# Patient Record
Sex: MALE | Race: BLACK OR AFRICAN AMERICAN | NOT HISPANIC OR LATINO | Employment: OTHER | RURAL
[De-identification: names, ages, dates, MRNs, and addresses within clinical notes are randomized per-mention and may not be internally consistent; named-entity substitution may affect disease eponyms.]

---

## 2017-04-19 ENCOUNTER — HISTORICAL (OUTPATIENT)
Dept: ADMINISTRATIVE | Facility: HOSPITAL | Age: 57
End: 2017-04-19

## 2017-04-20 LAB
LAB AP CLINICAL INFORMATION: NORMAL
LAB AP DIAGNOSIS - HISTORICAL: NORMAL
LAB AP GROSS PATHOLOGY - HISTORICAL: NORMAL
LAB AP SPECIMEN SUBMITTED - HISTORICAL: NORMAL

## 2020-05-12 ENCOUNTER — HISTORICAL (OUTPATIENT)
Dept: ADMINISTRATIVE | Facility: HOSPITAL | Age: 60
End: 2020-05-12

## 2020-05-14 LAB
LAB AP CLINICAL INFORMATION: NORMAL
LAB AP COMMENTS: NORMAL
LAB AP DIAGNOSIS - HISTORICAL: NORMAL
LAB AP GROSS PATHOLOGY - HISTORICAL: NORMAL
LAB AP SPECIMEN SUBMITTED - HISTORICAL: NORMAL

## 2021-05-10 ENCOUNTER — OFFICE VISIT (OUTPATIENT)
Dept: UROLOGY | Facility: CLINIC | Age: 61
End: 2021-05-10

## 2021-05-10 VITALS
BODY MASS INDEX: 35.36 KG/M2 | HEIGHT: 66 IN | WEIGHT: 220 LBS | SYSTOLIC BLOOD PRESSURE: 128 MMHG | TEMPERATURE: 98 F | OXYGEN SATURATION: 98 % | DIASTOLIC BLOOD PRESSURE: 80 MMHG | HEART RATE: 81 BPM

## 2021-05-10 DIAGNOSIS — C61 PROSTATE CANCER: Primary | ICD-10-CM

## 2021-05-10 DIAGNOSIS — R31.9 HEMATURIA, UNSPECIFIED TYPE: ICD-10-CM

## 2021-05-10 DIAGNOSIS — R97.20 PSA ELEVATION: ICD-10-CM

## 2021-05-10 LAB
BILIRUB UR QL STRIP: NEGATIVE
CLARITY UR: ABNORMAL
COLOR UR: YELLOW
GLUCOSE UR STRIP-MCNC: NEGATIVE MG/DL
KETONES UR STRIP-SCNC: NEGATIVE MG/DL
LEUKOCYTE ESTERASE UR QL STRIP: NEGATIVE
NITRITE UR QL STRIP: NEGATIVE
PH UR STRIP: 5 PH UNITS
PROT UR QL STRIP: NEGATIVE
RBC # UR STRIP: ABNORMAL /UL
SP GR UR STRIP: 1.02
UROBILINOGEN UR STRIP-ACNC: 0.2 MG/DL

## 2021-05-10 PROCEDURE — 81003 URINALYSIS, REFLEX TO URINE CULTURE: ICD-10-PCS | Mod: QW,,, | Performed by: CLINICAL MEDICAL LABORATORY

## 2021-05-10 PROCEDURE — 99213 PR OFFICE/OUTPT VISIT, EST, LEVL III, 20-29 MIN: ICD-10-PCS | Mod: S$PBB,,, | Performed by: UROLOGY

## 2021-05-10 PROCEDURE — 81003 URINALYSIS AUTO W/O SCOPE: CPT | Mod: QW,,, | Performed by: CLINICAL MEDICAL LABORATORY

## 2021-05-10 PROCEDURE — 99213 OFFICE O/P EST LOW 20 MIN: CPT | Mod: S$PBB,,, | Performed by: UROLOGY

## 2021-05-10 PROCEDURE — 99204 OFFICE O/P NEW MOD 45 MIN: CPT | Mod: PBBFAC | Performed by: UROLOGY

## 2021-05-10 RX ORDER — HYDROCHLOROTHIAZIDE 12.5 MG/1
12.5 CAPSULE ORAL DAILY
COMMUNITY
End: 2022-01-25 | Stop reason: SDUPTHER

## 2021-05-10 RX ORDER — MELOXICAM 7.5 MG/1
7.5 TABLET ORAL DAILY PRN
COMMUNITY
End: 2023-03-03 | Stop reason: SDUPTHER

## 2021-05-10 RX ORDER — TAMSULOSIN HYDROCHLORIDE 0.4 MG/1
CAPSULE ORAL DAILY
COMMUNITY
End: 2022-08-31 | Stop reason: SDUPTHER

## 2021-05-10 RX ORDER — LISINOPRIL 40 MG/1
40 TABLET ORAL DAILY
COMMUNITY
End: 2021-10-22

## 2021-09-06 ENCOUNTER — OFFICE VISIT (OUTPATIENT)
Dept: FAMILY MEDICINE | Facility: CLINIC | Age: 61
End: 2021-09-06
Payer: COMMERCIAL

## 2021-09-06 VITALS
WEIGHT: 214.38 LBS | BODY MASS INDEX: 34.45 KG/M2 | SYSTOLIC BLOOD PRESSURE: 133 MMHG | HEIGHT: 66 IN | RESPIRATION RATE: 17 BRPM | OXYGEN SATURATION: 99 % | TEMPERATURE: 99 F | DIASTOLIC BLOOD PRESSURE: 94 MMHG | HEART RATE: 62 BPM

## 2021-09-06 DIAGNOSIS — R31.0 GROSS HEMATURIA: ICD-10-CM

## 2021-09-06 DIAGNOSIS — N48.1 BALANITIS: ICD-10-CM

## 2021-09-06 DIAGNOSIS — R31.9 HEMATURIA: Primary | ICD-10-CM

## 2021-09-06 LAB
BILIRUB SERPL-MCNC: NEGATIVE MG/DL
BLOOD URINE, POC: ABNORMAL
COLOR, POC UA: ABNORMAL
GLUCOSE UR QL STRIP: NEGATIVE
KETONES UR QL STRIP: NEGATIVE
LEUKOCYTE ESTERASE URINE, POC: NEGATIVE
NITRITE, POC UA: NEGATIVE
PH, POC UA: 5.5
PROTEIN, POC: ABNORMAL
SPECIFIC GRAVITY, POC UA: 1.02
UROBILINOGEN, POC UA: 0.2

## 2021-09-06 PROCEDURE — 99214 OFFICE O/P EST MOD 30 MIN: CPT | Mod: ,,, | Performed by: FAMILY MEDICINE

## 2021-09-06 PROCEDURE — 99214 PR OFFICE/OUTPT VISIT, EST, LEVL IV, 30-39 MIN: ICD-10-PCS | Mod: ,,, | Performed by: FAMILY MEDICINE

## 2021-09-06 PROCEDURE — 81003 URINALYSIS AUTO W/O SCOPE: CPT | Mod: QW,,, | Performed by: FAMILY MEDICINE

## 2021-09-06 PROCEDURE — 81003 POCT URINALYSIS W/O SCOPE: ICD-10-PCS | Mod: QW,,, | Performed by: FAMILY MEDICINE

## 2021-09-06 RX ORDER — AMOXICILLIN AND CLAVULANATE POTASSIUM 875; 125 MG/1; MG/1
1 TABLET, FILM COATED ORAL EVERY 12 HOURS
Qty: 20 TABLET | Refills: 0 | Status: SHIPPED | OUTPATIENT
Start: 2021-09-06 | End: 2021-09-06

## 2021-09-06 RX ORDER — FLUCONAZOLE 200 MG/1
TABLET ORAL
Qty: 3 TABLET | Refills: 0 | Status: SHIPPED | OUTPATIENT
Start: 2021-09-06 | End: 2021-09-06

## 2021-09-28 ENCOUNTER — OFFICE VISIT (OUTPATIENT)
Dept: UROLOGY | Facility: CLINIC | Age: 61
End: 2021-09-28
Payer: COMMERCIAL

## 2021-09-28 VITALS
WEIGHT: 215 LBS | HEIGHT: 67 IN | BODY MASS INDEX: 33.74 KG/M2 | SYSTOLIC BLOOD PRESSURE: 147 MMHG | DIASTOLIC BLOOD PRESSURE: 100 MMHG | HEART RATE: 95 BPM

## 2021-09-28 DIAGNOSIS — M54.50 ACUTE LEFT-SIDED LOW BACK PAIN WITHOUT SCIATICA: ICD-10-CM

## 2021-09-28 DIAGNOSIS — N20.0 NEPHROLITHIASIS: ICD-10-CM

## 2021-09-28 DIAGNOSIS — R31.9 HEMATURIA, UNSPECIFIED TYPE: ICD-10-CM

## 2021-09-28 DIAGNOSIS — R31.9 HEMATURIA OF UNKNOWN CAUSE: ICD-10-CM

## 2021-09-28 DIAGNOSIS — C61 PROSTATE CANCER: Primary | ICD-10-CM

## 2021-09-28 DIAGNOSIS — N32.0 BLADDER OUTLET OBSTRUCTION: ICD-10-CM

## 2021-09-28 PROCEDURE — 99214 OFFICE O/P EST MOD 30 MIN: CPT | Mod: S$PBB,,, | Performed by: UROLOGY

## 2021-09-28 PROCEDURE — 99214 PR OFFICE/OUTPT VISIT, EST, LEVL IV, 30-39 MIN: ICD-10-PCS | Mod: S$PBB,,, | Performed by: UROLOGY

## 2021-09-28 PROCEDURE — 99214 OFFICE O/P EST MOD 30 MIN: CPT | Mod: PBBFAC | Performed by: UROLOGY

## 2021-10-12 ENCOUNTER — PROCEDURE VISIT (OUTPATIENT)
Dept: UROLOGY | Facility: CLINIC | Age: 61
End: 2021-10-12
Payer: COMMERCIAL

## 2021-10-12 VITALS
HEIGHT: 67 IN | HEART RATE: 91 BPM | WEIGHT: 218 LBS | SYSTOLIC BLOOD PRESSURE: 124 MMHG | BODY MASS INDEX: 34.21 KG/M2 | DIASTOLIC BLOOD PRESSURE: 86 MMHG

## 2021-10-12 DIAGNOSIS — Z01.812 ENCOUNTER FOR PREOPERATIVE SCREENING LABORATORY TESTING FOR COVID-19 VIRUS: ICD-10-CM

## 2021-10-12 DIAGNOSIS — Z01.810 PRE-PROCEDURAL CARDIOVASCULAR EXAMINATION: ICD-10-CM

## 2021-10-12 DIAGNOSIS — N13.8 BENIGN PROSTATIC HYPERPLASIA WITH URINARY OBSTRUCTION: Primary | ICD-10-CM

## 2021-10-12 DIAGNOSIS — N20.0 KIDNEY STONES: ICD-10-CM

## 2021-10-12 DIAGNOSIS — C61 CARCINOMA OF PROSTATE: ICD-10-CM

## 2021-10-12 DIAGNOSIS — R31.9 URINARY TRACT INFECTION WITH HEMATURIA, SITE UNSPECIFIED: ICD-10-CM

## 2021-10-12 DIAGNOSIS — N21.0 BLADDER STONES: ICD-10-CM

## 2021-10-12 DIAGNOSIS — Z11.52 ENCOUNTER FOR PREOPERATIVE SCREENING LABORATORY TESTING FOR COVID-19 VIRUS: ICD-10-CM

## 2021-10-12 DIAGNOSIS — N39.0 URINARY TRACT INFECTION WITH HEMATURIA, SITE UNSPECIFIED: ICD-10-CM

## 2021-10-12 DIAGNOSIS — Z01.812 PRE-PROCEDURAL LABORATORY EXAMINATION: ICD-10-CM

## 2021-10-12 DIAGNOSIS — N40.1 BENIGN PROSTATIC HYPERPLASIA WITH URINARY OBSTRUCTION: Primary | ICD-10-CM

## 2021-10-12 DIAGNOSIS — R31.9 HEMATURIA, UNSPECIFIED TYPE: ICD-10-CM

## 2021-10-12 PROCEDURE — 52000 PR CYSTOURETHROSCOPY: ICD-10-PCS | Mod: S$PBB,,, | Performed by: UROLOGY

## 2021-10-12 PROCEDURE — 87086 URINE CULTURE/COLONY COUNT: CPT | Mod: ,,, | Performed by: CLINICAL MEDICAL LABORATORY

## 2021-10-12 PROCEDURE — 52000 CYSTOURETHROSCOPY: CPT | Mod: PBBFAC | Performed by: UROLOGY

## 2021-10-12 PROCEDURE — 52000 CYSTOURETHROSCOPY: CPT | Mod: S$PBB,,, | Performed by: UROLOGY

## 2021-10-12 PROCEDURE — 87086 CULTURE, URINE: ICD-10-PCS | Mod: ,,, | Performed by: CLINICAL MEDICAL LABORATORY

## 2021-10-14 DIAGNOSIS — N40.1 BENIGN PROSTATIC HYPERPLASIA WITH URINARY OBSTRUCTION: Primary | ICD-10-CM

## 2021-10-14 DIAGNOSIS — N13.8 BENIGN PROSTATIC HYPERPLASIA WITH URINARY OBSTRUCTION: Primary | ICD-10-CM

## 2021-10-14 LAB — UA COMPLETE W REFLEX CULTURE PNL UR: NO GROWTH

## 2021-10-14 RX ORDER — CEFAZOLIN SODIUM 2 G/50ML
2 SOLUTION INTRAVENOUS ONCE
Status: CANCELLED | OUTPATIENT
Start: 2021-11-03

## 2021-10-14 RX ORDER — SODIUM CHLORIDE, SODIUM LACTATE, POTASSIUM CHLORIDE, CALCIUM CHLORIDE 600; 310; 30; 20 MG/100ML; MG/100ML; MG/100ML; MG/100ML
INJECTION, SOLUTION INTRAVENOUS CONTINUOUS
Status: CANCELLED | OUTPATIENT
Start: 2021-11-03

## 2021-11-01 ENCOUNTER — OFFICE VISIT (OUTPATIENT)
Dept: UROLOGY | Facility: CLINIC | Age: 61
End: 2021-11-01
Payer: COMMERCIAL

## 2021-11-01 ENCOUNTER — CLINICAL SUPPORT (OUTPATIENT)
Dept: CARDIOLOGY | Facility: CLINIC | Age: 61
End: 2021-11-01
Payer: COMMERCIAL

## 2021-11-01 ENCOUNTER — HOSPITAL ENCOUNTER (OUTPATIENT)
Dept: RADIOLOGY | Facility: HOSPITAL | Age: 61
Discharge: HOME | End: 2021-11-01
Attending: UROLOGY
Payer: COMMERCIAL

## 2021-11-01 VITALS
HEIGHT: 67 IN | WEIGHT: 215 LBS | SYSTOLIC BLOOD PRESSURE: 120 MMHG | BODY MASS INDEX: 33.74 KG/M2 | HEART RATE: 98 BPM | DIASTOLIC BLOOD PRESSURE: 79 MMHG

## 2021-11-01 DIAGNOSIS — N39.0 URINARY TRACT INFECTION WITH HEMATURIA, SITE UNSPECIFIED: ICD-10-CM

## 2021-11-01 DIAGNOSIS — R31.9 URINARY TRACT INFECTION WITH HEMATURIA, SITE UNSPECIFIED: ICD-10-CM

## 2021-11-01 DIAGNOSIS — R31.0 GROSS HEMATURIA: ICD-10-CM

## 2021-11-01 DIAGNOSIS — N40.1 BENIGN PROSTATIC HYPERPLASIA WITH URINARY OBSTRUCTION: Primary | ICD-10-CM

## 2021-11-01 DIAGNOSIS — N20.0 KIDNEY STONES: ICD-10-CM

## 2021-11-01 DIAGNOSIS — N21.0 BLADDER STONE: ICD-10-CM

## 2021-11-01 DIAGNOSIS — Z01.810 PRE-PROCEDURAL CARDIOVASCULAR EXAMINATION: ICD-10-CM

## 2021-11-01 DIAGNOSIS — N13.8 BENIGN PROSTATIC HYPERPLASIA WITH URINARY OBSTRUCTION: Primary | ICD-10-CM

## 2021-11-01 PROCEDURE — 93005 ELECTROCARDIOGRAM TRACING: CPT | Mod: PBBFAC | Performed by: INTERNAL MEDICINE

## 2021-11-01 PROCEDURE — 74018 RADEX ABDOMEN 1 VIEW: CPT | Mod: TC

## 2021-11-01 PROCEDURE — 99024 PR POST-OP FOLLOW-UP VISIT: ICD-10-PCS | Mod: ,,, | Performed by: UROLOGY

## 2021-11-01 PROCEDURE — 87086 CULTURE, URINE: ICD-10-PCS | Mod: ,,, | Performed by: CLINICAL MEDICAL LABORATORY

## 2021-11-01 PROCEDURE — 87086 URINE CULTURE/COLONY COUNT: CPT | Mod: ,,, | Performed by: CLINICAL MEDICAL LABORATORY

## 2021-11-01 PROCEDURE — 74018 RADEX ABDOMEN 1 VIEW: CPT | Mod: 26,,, | Performed by: RADIOLOGY

## 2021-11-01 PROCEDURE — 99213 OFFICE O/P EST LOW 20 MIN: CPT | Mod: PBBFAC | Performed by: UROLOGY

## 2021-11-01 PROCEDURE — 93010 ELECTROCARDIOGRAM REPORT: CPT | Mod: S$PBB,,, | Performed by: INTERNAL MEDICINE

## 2021-11-01 PROCEDURE — 93010 EKG 12-LEAD: ICD-10-PCS | Mod: S$PBB,,, | Performed by: INTERNAL MEDICINE

## 2021-11-01 PROCEDURE — 99212 OFFICE O/P EST SF 10 MIN: CPT | Mod: PBBFAC

## 2021-11-01 PROCEDURE — 99024 POSTOP FOLLOW-UP VISIT: CPT | Mod: ,,, | Performed by: UROLOGY

## 2021-11-01 PROCEDURE — 74018 XR KUB: ICD-10-PCS | Mod: 26,,, | Performed by: RADIOLOGY

## 2021-11-03 ENCOUNTER — HOSPITAL ENCOUNTER (OUTPATIENT)
Facility: HOSPITAL | Age: 61
Discharge: HOME | End: 2021-11-04
Attending: UROLOGY | Admitting: UROLOGY
Payer: COMMERCIAL

## 2021-11-03 ENCOUNTER — ANESTHESIA EVENT (OUTPATIENT)
Dept: SURGERY | Facility: HOSPITAL | Age: 61
End: 2021-11-03
Payer: COMMERCIAL

## 2021-11-03 ENCOUNTER — ANESTHESIA (OUTPATIENT)
Dept: SURGERY | Facility: HOSPITAL | Age: 61
End: 2021-11-03
Payer: COMMERCIAL

## 2021-11-03 DIAGNOSIS — N40.1 BENIGN PROSTATIC HYPERPLASIA WITH URINARY OBSTRUCTION: ICD-10-CM

## 2021-11-03 DIAGNOSIS — N13.8 BENIGN PROSTATIC HYPERPLASIA WITH URINARY OBSTRUCTION: ICD-10-CM

## 2021-11-03 PROCEDURE — 63600175 PHARM REV CODE 636 W HCPCS: Performed by: UROLOGY

## 2021-11-03 PROCEDURE — 25000003 PHARM REV CODE 250: Performed by: ANESTHESIOLOGY

## 2021-11-03 PROCEDURE — 37000008 HC ANESTHESIA 1ST 15 MINUTES: Performed by: UROLOGY

## 2021-11-03 PROCEDURE — 36000707: Performed by: UROLOGY

## 2021-11-03 PROCEDURE — 63600175 PHARM REV CODE 636 W HCPCS: Performed by: ANESTHESIOLOGY

## 2021-11-03 PROCEDURE — 52601 PR TRANSURETHRAL ELEC-SURG PROSTATECTOM: ICD-10-PCS | Mod: ,,, | Performed by: UROLOGY

## 2021-11-03 PROCEDURE — 88305 TISSUE EXAM BY PATHOLOGIST: CPT | Mod: 26,,, | Performed by: PATHOLOGY

## 2021-11-03 PROCEDURE — 27000716 HC OXISENSOR PROBE, ANY SIZE: Performed by: ANESTHESIOLOGY

## 2021-11-03 PROCEDURE — 88305 SURGICAL PATHOLOGY: ICD-10-PCS | Mod: 26,,, | Performed by: PATHOLOGY

## 2021-11-03 PROCEDURE — 37000009 HC ANESTHESIA EA ADD 15 MINS: Performed by: UROLOGY

## 2021-11-03 PROCEDURE — 94761 N-INVAS EAR/PLS OXIMETRY MLT: CPT

## 2021-11-03 PROCEDURE — 52318 REMOVE BLADDER STONE: CPT | Mod: 51,,, | Performed by: UROLOGY

## 2021-11-03 PROCEDURE — 36000706: Performed by: UROLOGY

## 2021-11-03 PROCEDURE — D9220A PRA ANESTHESIA: ICD-10-PCS | Mod: CRNA,,, | Performed by: ANESTHESIOLOGY

## 2021-11-03 PROCEDURE — 52318 PR LITHOLOPAXY; BY ANY MEANS, COMPLICATED/LARGE >2.5CM: ICD-10-PCS | Mod: 51,,, | Performed by: UROLOGY

## 2021-11-03 PROCEDURE — D9220A PRA ANESTHESIA: Mod: ANES,,, | Performed by: ANESTHESIOLOGY

## 2021-11-03 PROCEDURE — D9220A PRA ANESTHESIA: Mod: CRNA,,, | Performed by: ANESTHESIOLOGY

## 2021-11-03 PROCEDURE — 52601 PROSTATECTOMY (TURP): CPT | Mod: ,,, | Performed by: UROLOGY

## 2021-11-03 PROCEDURE — 27201423 OPTIME MED/SURG SUP & DEVICES STERILE SUPPLY: Performed by: UROLOGY

## 2021-11-03 PROCEDURE — 71000033 HC RECOVERY, INTIAL HOUR: Performed by: UROLOGY

## 2021-11-03 PROCEDURE — 71000016 HC POSTOP RECOV ADDL HR: Performed by: UROLOGY

## 2021-11-03 PROCEDURE — C1729 CATH, DRAINAGE: HCPCS | Performed by: UROLOGY

## 2021-11-03 PROCEDURE — 88305 TISSUE EXAM BY PATHOLOGIST: CPT | Mod: SUR | Performed by: UROLOGY

## 2021-11-03 PROCEDURE — 27000177 HC AIRWAY, LARYNGEAL MASK: Performed by: ANESTHESIOLOGY

## 2021-11-03 PROCEDURE — 71000015 HC POSTOP RECOV 1ST HR: Performed by: UROLOGY

## 2021-11-03 PROCEDURE — D9220A PRA ANESTHESIA: ICD-10-PCS | Mod: ANES,,, | Performed by: ANESTHESIOLOGY

## 2021-11-03 RX ORDER — CEFAZOLIN SODIUM 2 G/50ML
2 SOLUTION INTRAVENOUS
Status: DISCONTINUED | OUTPATIENT
Start: 2021-11-03 | End: 2021-11-04 | Stop reason: HOSPADM

## 2021-11-03 RX ORDER — OXYCODONE HYDROCHLORIDE 5 MG/1
5 TABLET ORAL
Status: DISCONTINUED | OUTPATIENT
Start: 2021-11-03 | End: 2021-11-03 | Stop reason: HOSPADM

## 2021-11-03 RX ORDER — DEXAMETHASONE SODIUM PHOSPHATE 4 MG/ML
INJECTION, SOLUTION INTRA-ARTICULAR; INTRALESIONAL; INTRAMUSCULAR; INTRAVENOUS; SOFT TISSUE
Status: DISCONTINUED | OUTPATIENT
Start: 2021-11-03 | End: 2021-11-03

## 2021-11-03 RX ORDER — ONDANSETRON 2 MG/ML
INJECTION INTRAMUSCULAR; INTRAVENOUS
Status: DISCONTINUED | OUTPATIENT
Start: 2021-11-03 | End: 2021-11-03

## 2021-11-03 RX ORDER — PHENYLEPHRINE HYDROCHLORIDE 10 MG/ML
INJECTION INTRAVENOUS
Status: DISCONTINUED | OUTPATIENT
Start: 2021-11-03 | End: 2021-11-03

## 2021-11-03 RX ORDER — ONDANSETRON 2 MG/ML
4 INJECTION INTRAMUSCULAR; INTRAVENOUS DAILY PRN
Status: DISCONTINUED | OUTPATIENT
Start: 2021-11-03 | End: 2021-11-03 | Stop reason: HOSPADM

## 2021-11-03 RX ORDER — LIDOCAINE HYDROCHLORIDE 20 MG/ML
INJECTION, SOLUTION EPIDURAL; INFILTRATION; INTRACAUDAL; PERINEURAL
Status: DISCONTINUED | OUTPATIENT
Start: 2021-11-03 | End: 2021-11-03

## 2021-11-03 RX ORDER — FENTANYL CITRATE 50 UG/ML
INJECTION, SOLUTION INTRAMUSCULAR; INTRAVENOUS
Status: DISCONTINUED | OUTPATIENT
Start: 2021-11-03 | End: 2021-11-03

## 2021-11-03 RX ORDER — MEPERIDINE HYDROCHLORIDE 25 MG/ML
25 INJECTION INTRAMUSCULAR; INTRAVENOUS; SUBCUTANEOUS EVERY 10 MIN PRN
Status: DISCONTINUED | OUTPATIENT
Start: 2021-11-03 | End: 2021-11-03 | Stop reason: HOSPADM

## 2021-11-03 RX ORDER — MIDAZOLAM HYDROCHLORIDE 1 MG/ML
INJECTION INTRAMUSCULAR; INTRAVENOUS
Status: DISCONTINUED | OUTPATIENT
Start: 2021-11-03 | End: 2021-11-03

## 2021-11-03 RX ORDER — DIPHENHYDRAMINE HYDROCHLORIDE 50 MG/ML
25 INJECTION INTRAMUSCULAR; INTRAVENOUS EVERY 6 HOURS PRN
Status: DISCONTINUED | OUTPATIENT
Start: 2021-11-03 | End: 2021-11-03 | Stop reason: HOSPADM

## 2021-11-03 RX ORDER — PROPOFOL 10 MG/ML
VIAL (ML) INTRAVENOUS
Status: DISCONTINUED | OUTPATIENT
Start: 2021-11-03 | End: 2021-11-03

## 2021-11-03 RX ORDER — CEFAZOLIN SODIUM 1 G/3ML
INJECTION, POWDER, FOR SOLUTION INTRAMUSCULAR; INTRAVENOUS
Status: DISCONTINUED | OUTPATIENT
Start: 2021-11-03 | End: 2021-11-03

## 2021-11-03 RX ORDER — MORPHINE SULFATE 10 MG/ML
4 INJECTION INTRAMUSCULAR; INTRAVENOUS; SUBCUTANEOUS EVERY 5 MIN PRN
Status: DISCONTINUED | OUTPATIENT
Start: 2021-11-03 | End: 2021-11-03 | Stop reason: HOSPADM

## 2021-11-03 RX ORDER — HYDROMORPHONE HYDROCHLORIDE 2 MG/ML
0.5 INJECTION, SOLUTION INTRAMUSCULAR; INTRAVENOUS; SUBCUTANEOUS EVERY 5 MIN PRN
Status: DISCONTINUED | OUTPATIENT
Start: 2021-11-03 | End: 2021-11-03 | Stop reason: HOSPADM

## 2021-11-03 RX ORDER — OXYCODONE HYDROCHLORIDE 5 MG/1
5 TABLET ORAL EVERY 4 HOURS PRN
Status: DISCONTINUED | OUTPATIENT
Start: 2021-11-03 | End: 2021-11-04 | Stop reason: HOSPADM

## 2021-11-03 RX ORDER — SODIUM CHLORIDE, SODIUM LACTATE, POTASSIUM CHLORIDE, CALCIUM CHLORIDE 600; 310; 30; 20 MG/100ML; MG/100ML; MG/100ML; MG/100ML
INJECTION, SOLUTION INTRAVENOUS CONTINUOUS
Status: DISCONTINUED | OUTPATIENT
Start: 2021-11-03 | End: 2021-11-04 | Stop reason: HOSPADM

## 2021-11-03 RX ORDER — ONDANSETRON 2 MG/ML
4 INJECTION INTRAMUSCULAR; INTRAVENOUS EVERY 12 HOURS PRN
Status: DISCONTINUED | OUTPATIENT
Start: 2021-11-03 | End: 2021-11-04 | Stop reason: HOSPADM

## 2021-11-03 RX ADMIN — DEXAMETHASONE SODIUM PHOSPHATE 4 MG: 4 INJECTION, SOLUTION INTRA-ARTICULAR; INTRALESIONAL; INTRAMUSCULAR; INTRAVENOUS; SOFT TISSUE at 08:11

## 2021-11-03 RX ADMIN — SODIUM CHLORIDE, POTASSIUM CHLORIDE, SODIUM LACTATE AND CALCIUM CHLORIDE: 600; 310; 30; 20 INJECTION, SOLUTION INTRAVENOUS at 08:11

## 2021-11-03 RX ADMIN — PHENYLEPHRINE HYDROCHLORIDE 50 MCG: 10 INJECTION INTRAVENOUS at 09:11

## 2021-11-03 RX ADMIN — MIDAZOLAM 2 MG: 1 INJECTION INTRAMUSCULAR; INTRAVENOUS at 08:11

## 2021-11-03 RX ADMIN — CEFAZOLIN 2000 MG: 1 INJECTION, POWDER, FOR SOLUTION INTRAMUSCULAR; INTRAVENOUS; PARENTERAL at 08:11

## 2021-11-03 RX ADMIN — FENTANYL CITRATE 50 MCG: 50 INJECTION INTRAMUSCULAR; INTRAVENOUS at 10:11

## 2021-11-03 RX ADMIN — SODIUM CHLORIDE, POTASSIUM CHLORIDE, SODIUM LACTATE AND CALCIUM CHLORIDE: 600; 310; 30; 20 INJECTION, SOLUTION INTRAVENOUS at 09:11

## 2021-11-03 RX ADMIN — PHENYLEPHRINE HYDROCHLORIDE 100 MCG: 10 INJECTION INTRAVENOUS at 09:11

## 2021-11-03 RX ADMIN — ONDANSETRON 4 MG: 2 INJECTION INTRAMUSCULAR; INTRAVENOUS at 08:11

## 2021-11-03 RX ADMIN — FENTANYL CITRATE 50 MCG: 50 INJECTION INTRAMUSCULAR; INTRAVENOUS at 08:11

## 2021-11-03 RX ADMIN — LIDOCAINE HYDROCHLORIDE 100 MG: 20 INJECTION, SOLUTION INTRAVENOUS at 08:11

## 2021-11-03 RX ADMIN — FENTANYL CITRATE 50 MCG: 50 INJECTION INTRAMUSCULAR; INTRAVENOUS at 09:11

## 2021-11-03 RX ADMIN — PROPOFOL 100 MG: 10 INJECTION, EMULSION INTRAVENOUS at 08:11

## 2021-11-04 VITALS
RESPIRATION RATE: 16 BRPM | SYSTOLIC BLOOD PRESSURE: 136 MMHG | WEIGHT: 213 LBS | BODY MASS INDEX: 34.23 KG/M2 | HEIGHT: 66 IN | HEART RATE: 86 BPM | OXYGEN SATURATION: 100 % | DIASTOLIC BLOOD PRESSURE: 84 MMHG | TEMPERATURE: 99 F

## 2021-11-04 LAB
ESTROGEN SERPL-MCNC: NORMAL PG/ML
HCT VFR BLD AUTO: 33.4 % (ref 40–54)
HGB BLD-MCNC: 11.2 G/DL (ref 13.5–18)
LAB AP GROSS DESCRIPTION: NORMAL
LAB AP LABORATORY NOTES: NORMAL
T3RU NFR SERPL: NORMAL %
UA COMPLETE W REFLEX CULTURE PNL UR: NO GROWTH

## 2021-11-04 PROCEDURE — 51798 US URINE CAPACITY MEASURE: CPT

## 2021-11-04 PROCEDURE — 85018 HEMOGLOBIN: CPT | Performed by: UROLOGY

## 2021-11-04 PROCEDURE — 36415 COLL VENOUS BLD VENIPUNCTURE: CPT | Performed by: UROLOGY

## 2021-11-04 PROCEDURE — 63600175 PHARM REV CODE 636 W HCPCS: Performed by: UROLOGY

## 2021-11-04 PROCEDURE — 85014 HEMATOCRIT: CPT | Performed by: UROLOGY

## 2021-11-04 RX ADMIN — SODIUM CHLORIDE, POTASSIUM CHLORIDE, SODIUM LACTATE AND CALCIUM CHLORIDE: 600; 310; 30; 20 INJECTION, SOLUTION INTRAVENOUS at 06:11

## 2021-12-08 ENCOUNTER — OFFICE VISIT (OUTPATIENT)
Dept: UROLOGY | Facility: CLINIC | Age: 61
End: 2021-12-08
Payer: COMMERCIAL

## 2021-12-08 VITALS
DIASTOLIC BLOOD PRESSURE: 75 MMHG | BODY MASS INDEX: 34.21 KG/M2 | SYSTOLIC BLOOD PRESSURE: 119 MMHG | HEIGHT: 67 IN | WEIGHT: 218 LBS | HEART RATE: 79 BPM

## 2021-12-08 DIAGNOSIS — R97.20 ELEVATED PSA: ICD-10-CM

## 2021-12-08 DIAGNOSIS — Z09 POSTOP CHECK: Primary | ICD-10-CM

## 2021-12-08 DIAGNOSIS — N32.0 BLADDER OUTLET OBSTRUCTION: ICD-10-CM

## 2021-12-08 DIAGNOSIS — C61 MALIGNANT NEOPLASM OF PROSTATE: ICD-10-CM

## 2021-12-08 PROCEDURE — 99024 PR POST-OP FOLLOW-UP VISIT: ICD-10-PCS | Mod: ,,, | Performed by: UROLOGY

## 2021-12-08 PROCEDURE — 99214 OFFICE O/P EST MOD 30 MIN: CPT | Mod: PBBFAC | Performed by: UROLOGY

## 2021-12-08 PROCEDURE — 99024 POSTOP FOLLOW-UP VISIT: CPT | Mod: ,,, | Performed by: UROLOGY

## 2021-12-08 RX ORDER — INDOMETHACIN 25 MG/1
25 CAPSULE ORAL DAILY PRN
COMMUNITY
End: 2022-08-31 | Stop reason: SDUPTHER

## 2022-01-25 RX ORDER — HYDROCHLOROTHIAZIDE 12.5 MG/1
12.5 CAPSULE ORAL DAILY
Qty: 90 CAPSULE | Refills: 1 | Status: SHIPPED | OUTPATIENT
Start: 2022-01-25 | End: 2022-08-31 | Stop reason: SDUPTHER

## 2022-01-25 RX ORDER — LISINOPRIL 40 MG/1
40 TABLET ORAL DAILY
Qty: 90 TABLET | Refills: 1 | Status: SHIPPED | OUTPATIENT
Start: 2022-01-25 | End: 2022-08-31 | Stop reason: SDUPTHER

## 2022-06-08 ENCOUNTER — OFFICE VISIT (OUTPATIENT)
Dept: UROLOGY | Facility: CLINIC | Age: 62
End: 2022-06-08
Payer: COMMERCIAL

## 2022-06-08 VITALS
BODY MASS INDEX: 34.37 KG/M2 | WEIGHT: 219 LBS | DIASTOLIC BLOOD PRESSURE: 79 MMHG | HEART RATE: 81 BPM | SYSTOLIC BLOOD PRESSURE: 129 MMHG | HEIGHT: 67 IN

## 2022-06-08 DIAGNOSIS — C61 MALIGNANT NEOPLASM OF PROSTATE: Primary | ICD-10-CM

## 2022-06-08 DIAGNOSIS — N41.1 CHRONIC PROSTATITIS: ICD-10-CM

## 2022-06-08 DIAGNOSIS — N40.0 BENIGN PROSTATIC HYPERPLASIA WITHOUT LOWER URINARY TRACT SYMPTOMS: ICD-10-CM

## 2022-06-08 PROCEDURE — 99212 OFFICE O/P EST SF 10 MIN: CPT | Mod: S$PBB,,, | Performed by: UROLOGY

## 2022-06-08 PROCEDURE — 99214 OFFICE O/P EST MOD 30 MIN: CPT | Mod: PBBFAC | Performed by: UROLOGY

## 2022-06-08 PROCEDURE — 99212 PR OFFICE/OUTPT VISIT, EST, LEVL II, 10-19 MIN: ICD-10-PCS | Mod: S$PBB,,, | Performed by: UROLOGY

## 2022-06-08 NOTE — PROGRESS NOTES
I Subjective:       Patient ID: Amrita Banerjee is a 62 y.o. male.    Chief Complaint: Follow-up (Six month PSA, C61  and office visit. )        PATIENT: AMRITA BANERJEE               CASE NUMBER:     [id number]        1960 AGE: 60 yrs SEX: M          ACCOUNT NUMBER:   [id number]     RACE:    Black                                  UNIT NUMBER:     [id number]     ATTENDING                                       DATE COLLECTED:   2020              Jackson Martínez M.D.     PHYSICIAN:                                                     DATE RECEIVED: 2020                                                     DATE REPORTED: 2020       DIAGNOSIS:   A.  Left lateral prostate #1, biopsies:          - Focal high grade prostatic intraepithelial neoplasia         - Mild chronic prostatitis          - See comment   B.  Left lateral prostate #2, biopsies:          - Focal high grade prostatic intraepithelial neoplasia         - Mild chronic prostatitis          - See comment   C.  Right lateral prostate #3, biopsies:          - Elvie's grade 3+3=6 adenocarcinoma of the prostate involving         two of the seven biopsy            fragments (approximately 3% of the specimen)         - High grade prostatic intraepithelial neoplasia         - Mild chronic prostatitis          - See comment   D.  Right lateral prostate #4, biopsies:          - Focal high grade prostatic intraepithelial neoplasia         - See comment      COMMENTS:       Properly controlled immunohistochemical cocktails for                     PIN 4 are performed on all four of the specimens. There                    is a lack of basal staining along with positive AMACR                     staining within the invasive foci of specimen C. This                     helps to confirm the diagnosis. There is also some                     AMACR staining within the areas of high grade                     prostatic intraepithelial  "neoplasia.                        Dr. Koehler has reviewed a selected portion of this   case,                     and he agrees with the findings.      SPECIMEN SUBMITTED:   A.  Bx's left lateral prostate #1   B.  Bx's left lateral prostate #2   C.  Bx's right lateral prostate #3   D.  Bx's right lateral prostate #4      GROSS DESCRIPTION:   A. Specimen A is received in formalin designated "bx's left lateral        prostate #1" and consists of three tan stringy biopsy fragments        ranging in length from 0.9 to 1.5 cm.  Additionally there are two        hemorrhagic tan filamentous fragments measuring 1.0 and 1.4 cm        respectively.  All submitted in block A1.   B. Specimen B is received in formalin designated "bx's left lateral        prostate #2" and consists of three tan stringy biopsy fragments        ranging in length from 1.4 to 1.5 cm.  Additionally there are two        hemorrhagic tan filamentous fragments measuring 0.3 and 0.5 cm        respectively.  All submitted in block B1.   C. Specimen C is received in formalin designated "bx's right lateral        prostate #3" and consists of three tan stringy biopsy fragments        ranging in length from 1.1 to 2.3 cm in which the longest is        divided.  Additionally there is a tan hemorrhagic filamentous        fragment measuring 0.7 cm in length.  All submitted in block C1.   D. Specimen D is received in formalin designated "bx's right lateral        prostate #4" and consists of three tan stringy biopsy fragments        ranging in length from 0.7 to 1.0 cm.  Additionally there are five        hemorrhagic pink to tan filamentous fragments ranging from 0.1 to        0.5 cm.  All submitted in block D1.  CAC/stg      MICROSCOPIC DESCRIPTION:   A microscopic examination has been performed.      Clinical History: R97.20, PSA 4.87                                                    Electronically Signed By:                                                       "               Martin Chun Jr., MD                                                                  05/14/2020 11:21      Result comment: The above 1 analytes were performed by [facility]   [address] [place] [address],[place] [id number]      ----------------------------------------------------------------------------------------------------------------------------------------------------------------------------------------------------------------------------------------------------------------------------------------------------------------------  History of Present Illness  General  Patient here for 6 month f/u with PSA prior. F/u low grade low volume prostate cancer on active survellaince by monitoring the numbers. Recent PSA by primary. PSA 4.9 a little higher. His number fluctuates, but has not gone up much at all. Still taking Flomax-no changes with urination.      Assessment:  CAP        Plan:  3 months with PSA prior   Continue Flomax 0.4 mg #90 with 3 refills  *medications reviewed-will continue Flomax.      Last visit below:  Patient presents today for 2 month f/u with PSA prior. His numbers look good. Diagnosed  with low risk prostate cancer a few months ago with active survellaince by monitoring the numbers. Has weak stream, but about the same. Taking Flomax to help him void better. Reports he is taking otc supplement once weekly for prostate-discussed.   Patient would like to wait 6 months for f/u.      Assessment:  CAP  BPH  Hx of elevated PSA     Plan:  PSA in 6 months with PSA prior   Continue Flomax 0.4 mg daily-new rx #90 with 11 refills.      Last clinic note 06/03/2020  60-year-old male here today for discussion further on recent diagnosis of very low risk prostate cancer Elvie 3+3 and one at 12 cores with a PSA of less than 10. He is interested in focal ablation.     Assessment  Very low Risk prostate cancer  Luts     Plan  We again discussed all the treatment modalities in detail. Also offer  him genetic testing such as for MDX. We again reviewed asked surveillance  Explained how total gland ablation is performed  At this time we'll just repeat a PSA in 2 months. We will trend and based on this will get a prostate MRI at one year of diagnosis he changes his mind on treatment program  -------------------------------------------------------------------------------------------------------------------------------------------------------------------------------------------------------------------------------------------------------------------------------------  Above note of Feb. 8, 2021 by Dr. YEN Martínez     Ref Range    0.000-4.100  ng/mL   Feb 5 2021 15:16 C 4.950 H    Aug 3 2020 09:49 C 3.850   May 4 2020 09:40 C 4.870 H    Jan 7 2020 09:17 C 3.920 H    Mar 29 2019 13:06 C 3.640 H    Feb 2 2018 10:57 C 2.500 Maar 20 2017 08:30 C 2.800   Jan 9 2015 14:07 C 1.520   ---------------------------------------------------------------------------------------------     PSA was 4.060 on May 10, 2021  PSA was 5.200 on September 28, 2021  PSA was 2.390 on June 8, 2022     Mr. Dillon is former patient of Dr. Jackson Martínez who found him to have well differentiated prostate cancer and the patient has been managed conservatively for that.  Patient was treated by me for stone several years ago.  His wife is Elba Ellington and his sister is Ernesto Almanzar who were former employees.  Patient also has blood in the urine and low back pain.  He has passed a few clots in his urine in recent past.  He has had 2 or 3 stones in the past but does not think he has had any since around 2005. The back pain that he has is primarily in the left lower back.  Urine flow has been somewhat slower recently also.  The prostate was reported as being 39 grams in size.  Does not void as well as he used to as his urine flow has decreased in recent past seemingly. (September 28,  2021)   ----------------------------------------------------------------------------------------------------------------------------------------------------------------------------------------------------------------------------------------------------------------     Follow up in 20 days (on 11/1/2021) for Pre op work up, CBC, BMP, COVID, EKG and KUB prior to visit.  Discussed cystoscopy findings with patient.  Recommended TURP and laser lithotripsy of bladder stones.  I think the grade 3 prostate cancer will still be managed conservatively and patient is agreeable with that plan.  He was given 3 days Cipro 500 mg b.i.d. to cover instrumentation.  Urine sent for culture.  (October 12, 2021)     Patient returned today for his outpatient workup in anticipation of having TURP and laser lithotripsy of large bladder stone Wednesday November 3rd.  He has not seen a lot of blood since he got over the cysto.  He has had no worsening bladder outlet obstructive symptoms.  Patient agreeable to proceeding with TURP and laser lithotripsy of bladder stone on Wednesday under general anesthesia.  (November 1, 2021)                 Plan:    patient agreeable to TURP and laser lithotripsy of large bladder stone on Wednesday November 3rd.  Urine sent for culture to make sure there is no infection.  Last culture was negative.  Instructions for admission to outpatient surgery Wednesday given by Ms. Lemus and understood by patient.  He understands he will have to spend at least 1 night in the hospital.*                Other Notes     All notes        Instructions     Patient agreeable to TURP and laser lithotripsy of large bladder stone on Wednesday November 3rd.  Urine sent for culture to make sure there is no infection.  Last culture was negative.  Instructions for admission to outpatient surgery Wednesday given by Ms. Lemus and understood by patient.  He understands he will have to spend at least 1 night in the hospital  He will  take morning medicine with a small amount of water prior to leaving home.     ------------------------------------------------------------------------------------------------------------------------------------------------------------------------------------------------------------------------------  Mr. Donita Song underwent TURP on November 3.  He has done very well.  There is no cancer in the path report that he still needs to be followed for well differentiated prostate cancer that was moved but needle biopsies previously.  He is pleased with the results of surgery.  Nocturia times 0 and has an excellent stream.  He has had no recent visible blood in the urine.  Now 5 weeks postop.  He has no new complaints.  (December 8, 2021)      Mr. Dillon is in for follow-up of prostate cancer.  He was originally found to have well differentiated prostate cancer by Dr. Martínez in 2020. He did not have any cancer in prostate chips when he had TURP November 3, 2021. He has continued to void very well.  Can sleep all night usually without having to get up to void.  No bleeding and no new  problems.  (June 8, 2022)    Review of Systems      Objective:      Physical Exam  Constitutional:       Appearance: Normal appearance. He is normal weight.   Genitourinary:     Prostate: Normal.      Rectum: Normal.      Comments: Prostate 30-40 g smooth firm and symmetrical.  Neurological:      Mental Status: He is alert.   Psychiatric:         Mood and Affect: Mood normal.         Behavior: Behavior normal.       urinalysis revealed only occasional pus cells.  The dipstick negative with pH 6.0 and specific gravity 1.015  Assessment:       Problem List Items Addressed This Visit    None     Visit Diagnoses     Malignant neoplasm of prostate    -  Primary    Relevant Orders    PSA, Total (Diagnostic) (Completed)    PSA, Total (Diagnostic)    Benign prostatic hyperplasia without lower urinary tract symptoms        Chronic prostatitis               Plan:     Patient doing well clinically and having no trouble voiding.  PSA was obtained on way out of the building and subsequently returned as 2.390.  Patient notified of results by telephone.  Six month appointment with PSA.  *

## 2022-06-08 NOTE — PATIENT INSTRUCTIONS
Patient doing well clinically and having no trouble voiding.  PSA was obtained on way out of the building and subsequently returned as 2.390.  Patient notified of results by telephone.  Six month appointment with PSA.

## 2022-08-31 ENCOUNTER — OFFICE VISIT (OUTPATIENT)
Dept: FAMILY MEDICINE | Facility: CLINIC | Age: 62
End: 2022-08-31
Payer: COMMERCIAL

## 2022-08-31 VITALS
WEIGHT: 220 LBS | OXYGEN SATURATION: 99 % | HEIGHT: 67 IN | RESPIRATION RATE: 18 BRPM | DIASTOLIC BLOOD PRESSURE: 83 MMHG | SYSTOLIC BLOOD PRESSURE: 135 MMHG | TEMPERATURE: 98 F | BODY MASS INDEX: 34.53 KG/M2 | HEART RATE: 79 BPM

## 2022-08-31 DIAGNOSIS — M10.9 GOUT, UNSPECIFIED CAUSE, UNSPECIFIED CHRONICITY, UNSPECIFIED SITE: Primary | ICD-10-CM

## 2022-08-31 DIAGNOSIS — I10 PRIMARY HYPERTENSION: ICD-10-CM

## 2022-08-31 PROCEDURE — 99214 OFFICE O/P EST MOD 30 MIN: CPT | Mod: 25,,, | Performed by: FAMILY MEDICINE

## 2022-08-31 PROCEDURE — 96372 PR INJECTION,THERAP/PROPH/DIAG2ST, IM OR SUBCUT: ICD-10-PCS | Mod: ,,, | Performed by: FAMILY MEDICINE

## 2022-08-31 PROCEDURE — 99214 PR OFFICE/OUTPT VISIT, EST, LEVL IV, 30-39 MIN: ICD-10-PCS | Mod: 25,,, | Performed by: FAMILY MEDICINE

## 2022-08-31 PROCEDURE — 96372 THER/PROPH/DIAG INJ SC/IM: CPT | Mod: ,,, | Performed by: FAMILY MEDICINE

## 2022-08-31 RX ORDER — HYDROCHLOROTHIAZIDE 12.5 MG/1
12.5 CAPSULE ORAL DAILY
Qty: 90 CAPSULE | Refills: 1 | Status: SHIPPED | OUTPATIENT
Start: 2022-08-31 | End: 2023-03-03 | Stop reason: SDUPTHER

## 2022-08-31 RX ORDER — KETOROLAC TROMETHAMINE 30 MG/ML
60 INJECTION, SOLUTION INTRAMUSCULAR; INTRAVENOUS
Status: COMPLETED | OUTPATIENT
Start: 2022-08-31 | End: 2022-08-31

## 2022-08-31 RX ORDER — INDOMETHACIN 25 MG/1
25 CAPSULE ORAL DAILY PRN
Qty: 30 CAPSULE | Refills: 5 | Status: SHIPPED | OUTPATIENT
Start: 2022-08-31 | End: 2024-03-26

## 2022-08-31 RX ORDER — PREDNISONE 20 MG/1
20 TABLET ORAL DAILY
Qty: 5 TABLET | Refills: 0 | Status: SHIPPED | OUTPATIENT
Start: 2022-08-31 | End: 2022-09-05

## 2022-08-31 RX ORDER — DEXAMETHASONE SODIUM PHOSPHATE 4 MG/ML
6 INJECTION, SOLUTION INTRA-ARTICULAR; INTRALESIONAL; INTRAMUSCULAR; INTRAVENOUS; SOFT TISSUE
Status: COMPLETED | OUTPATIENT
Start: 2022-08-31 | End: 2022-08-31

## 2022-08-31 RX ORDER — TAMSULOSIN HYDROCHLORIDE 0.4 MG/1
0.4 CAPSULE ORAL DAILY
Qty: 90 CAPSULE | Refills: 1 | Status: SHIPPED | OUTPATIENT
Start: 2022-08-31 | End: 2023-03-03

## 2022-08-31 RX ORDER — LISINOPRIL 40 MG/1
40 TABLET ORAL DAILY
Qty: 90 TABLET | Refills: 1 | Status: SHIPPED | OUTPATIENT
Start: 2022-08-31 | End: 2023-03-03

## 2022-08-31 RX ADMIN — KETOROLAC TROMETHAMINE 60 MG: 30 INJECTION, SOLUTION INTRAMUSCULAR; INTRAVENOUS at 10:08

## 2022-08-31 RX ADMIN — DEXAMETHASONE SODIUM PHOSPHATE 6 MG: 4 INJECTION, SOLUTION INTRA-ARTICULAR; INTRALESIONAL; INTRAMUSCULAR; INTRAVENOUS; SOFT TISSUE at 10:08

## 2022-08-31 NOTE — PROGRESS NOTES
Subjective:       Patient ID: Ernesto Dillon is a 62 y.o. male.    Chief Complaint: Foot Swelling (Right foot swelling and red. Hx of gout.  Refill on all medications)    HPI  Review of Systems   Constitutional:  Negative for activity change, appetite change, chills, diaphoresis, fatigue, fever and unexpected weight change.   HENT:  Negative for nasal congestion, dental problem, drooling, ear discharge, ear pain, facial swelling, hearing loss, mouth sores, nosebleeds, postnasal drip, rhinorrhea, sinus pressure/congestion, sneezing, sore throat, tinnitus, trouble swallowing, voice change and goiter.    Eyes:  Negative for photophobia, pain, discharge, redness, itching and visual disturbance.   Respiratory:  Negative for apnea, cough, choking, chest tightness, shortness of breath, wheezing and stridor.    Cardiovascular:  Negative for chest pain, palpitations, leg swelling and claudication.   Gastrointestinal:  Negative for abdominal distention, abdominal pain, anal bleeding, blood in stool, change in bowel habit, constipation, diarrhea, nausea, vomiting, reflux, fecal incontinence and change in bowel habit.   Endocrine: Negative for cold intolerance, heat intolerance, polydipsia, polyphagia and polyuria.   Genitourinary:  Negative for bladder incontinence, decreased urine volume, difficulty urinating, discharge, dysuria, enuresis, erectile dysfunction, flank pain, frequency, genital sores, hematuria, penile pain, testicular pain and urgency.   Musculoskeletal:  Positive for arthralgias and joint swelling. Negative for back pain, gait problem, leg pain, myalgias, neck pain, neck stiffness and joint deformity.   Integumentary:  Negative for pallor, rash, wound and mole/lesion.   Allergic/Immunologic: Negative for environmental allergies, food allergies and frequent infections.   Neurological:  Negative for dizziness, vertigo, tremors, seizures, syncope, facial asymmetry, speech difficulty, weakness,  light-headedness, numbness, headaches, coordination difficulties, memory loss and coordination difficulties.   Hematological:  Negative for adenopathy. Does not bruise/bleed easily.   Psychiatric/Behavioral:  Negative for agitation, behavioral problems, confusion, decreased concentration, dysphoric mood, hallucinations, self-injury, sleep disturbance and suicidal ideas. The patient is not nervous/anxious and is not hyperactive.        Objective:      Physical Exam  Vitals reviewed.   Constitutional:       Appearance: Normal appearance. He is normal weight.   HENT:      Head: Normocephalic and atraumatic.      Right Ear: Tympanic membrane and ear canal normal.      Left Ear: Tympanic membrane, ear canal and external ear normal.      Nose: Nose normal.      Mouth/Throat:      Mouth: Mucous membranes are moist.      Pharynx: Oropharynx is clear.   Eyes:      Extraocular Movements: Extraocular movements intact.      Conjunctiva/sclera: Conjunctivae normal.      Pupils: Pupils are equal, round, and reactive to light.   Cardiovascular:      Rate and Rhythm: Normal rate and regular rhythm.      Pulses: Normal pulses.      Heart sounds: Normal heart sounds.   Pulmonary:      Effort: Pulmonary effort is normal.      Breath sounds: Normal breath sounds.   Abdominal:      General: Abdomen is flat. Bowel sounds are normal.      Palpations: Abdomen is soft.   Musculoskeletal:         General: Swelling and tenderness present. Normal range of motion.      Cervical back: Normal range of motion and neck supple.   Skin:     General: Skin is warm and dry.   Neurological:      General: No focal deficit present.      Mental Status: He is alert and oriented to person, place, and time. Mental status is at baseline.   Psychiatric:         Mood and Affect: Mood normal.         Behavior: Behavior normal.         Thought Content: Thought content normal.         Judgment: Judgment normal.       Assessment:       1. Gout, unspecified cause,  unspecified chronicity, unspecified site    2. Primary hypertension        Plan:     Gout, unspecified cause, unspecified chronicity, unspecified site  -     ketorolac injection 60 mg  -     dexamethasone injection 6 mg    Primary hypertension    Other orders  -     predniSONE (DELTASONE) 20 MG tablet; Take 1 tablet (20 mg total) by mouth once daily. for 5 days  Dispense: 5 tablet; Refill: 0  -     hydroCHLOROthiazide (MICROZIDE) 12.5 mg capsule; Take 1 capsule (12.5 mg total) by mouth once daily.  Dispense: 90 capsule; Refill: 1  -     indomethacin (INDOCIN) 25 MG capsule; Take 1 capsule (25 mg total) by mouth daily as needed.  Dispense: 30 capsule; Refill: 5  -     lisinopriL (PRINIVIL,ZESTRIL) 40 MG tablet; Take 1 tablet (40 mg total) by mouth once daily.  Dispense: 90 tablet; Refill: 1  -     tamsulosin (FLOMAX) 0.4 mg Cap; Take 1 capsule (0.4 mg total) by mouth once daily.  Dispense: 90 capsule; Refill: 1

## 2022-12-13 ENCOUNTER — OFFICE VISIT (OUTPATIENT)
Dept: UROLOGY | Facility: CLINIC | Age: 62
End: 2022-12-13
Payer: COMMERCIAL

## 2022-12-13 VITALS
HEIGHT: 67 IN | BODY MASS INDEX: 35.79 KG/M2 | WEIGHT: 228 LBS | SYSTOLIC BLOOD PRESSURE: 136 MMHG | HEART RATE: 83 BPM | DIASTOLIC BLOOD PRESSURE: 88 MMHG

## 2022-12-13 DIAGNOSIS — N41.1 CHRONIC PROSTATITIS: ICD-10-CM

## 2022-12-13 DIAGNOSIS — I10 ESSENTIAL HYPERTENSION: ICD-10-CM

## 2022-12-13 DIAGNOSIS — Z87.442 HISTORY OF NEPHROLITHIASIS: ICD-10-CM

## 2022-12-13 DIAGNOSIS — Z80.42 FAMILY HISTORY OF PROSTATE CANCER: ICD-10-CM

## 2022-12-13 DIAGNOSIS — C61 MALIGNANT NEOPLASM OF PROSTATE: Primary | ICD-10-CM

## 2022-12-13 PROCEDURE — 99213 OFFICE O/P EST LOW 20 MIN: CPT | Mod: S$PBB,,, | Performed by: UROLOGY

## 2022-12-13 PROCEDURE — 99214 OFFICE O/P EST MOD 30 MIN: CPT | Mod: PBBFAC | Performed by: UROLOGY

## 2022-12-13 PROCEDURE — 99213 PR OFFICE/OUTPT VISIT, EST, LEVL III, 20-29 MIN: ICD-10-PCS | Mod: S$PBB,,, | Performed by: UROLOGY

## 2022-12-13 NOTE — PROGRESS NOTES
Subjective:       Patient ID: Amrita Banerjee is a 62 y.o. male.    Chief Complaint: Follow-up (6 month visit, PSA c61)      PATIENT: AMRITA BANERJEE               CASE NUMBER:     [id number]        1960 AGE: 60 yrs SEX: M          ACCOUNT NUMBER:   [id number]     RACE:    Black                                  UNIT NUMBER:     [id number]     ATTENDING                                       DATE COLLECTED:   2020              Jackson Martínez M.D.     PHYSICIAN:                                                     DATE RECEIVED: 2020                                                     DATE REPORTED: 2020       DIAGNOSIS:   A.  Left lateral prostate #1, biopsies:          - Focal high grade prostatic intraepithelial neoplasia         - Mild chronic prostatitis          - See comment   B.  Left lateral prostate #2, biopsies:          - Focal high grade prostatic intraepithelial neoplasia         - Mild chronic prostatitis          - See comment   C.  Right lateral prostate #3, biopsies:          - [place]'s grade 3+3=6 adenocarcinoma of the prostate involving         two of the seven biopsy            fragments (approximately 3% of the specimen)         - High grade prostatic intraepithelial neoplasia         - Mild chronic prostatitis          - See comment   D.  Right lateral prostate #4, biopsies:          - Focal high grade prostatic intraepithelial neoplasia         - See comment      COMMENTS:       Properly controlled immunohistochemical cocktails for                     PIN 4 are performed on all four of the specimens. There                    is a lack of basal staining along with positive AMACR                     staining within the invasive foci of specimen C. This                     helps to confirm the diagnosis. There is also some                     AMACR staining within the areas of high grade                     prostatic intraepithelial neoplasia.                "         Dr. Koehler has reviewed a selected portion of this   case,                     and he agrees with the findings.      SPECIMEN SUBMITTED:   A.  Bx's left lateral prostate #1   B.  Bx's left lateral prostate #2   C.  Bx's right lateral prostate #3   D.  Bx's right lateral prostate #4      GROSS DESCRIPTION:   A. Specimen A is received in formalin designated "bx's left lateral        prostate #1" and consists of three tan stringy biopsy fragments        ranging in length from 0.9 to 1.5 cm.  Additionally there are two        hemorrhagic tan filamentous fragments measuring 1.0 and 1.4 cm        respectively.  All submitted in block A1.   B. Specimen B is received in formalin designated "bx's left lateral        prostate #2" and consists of three tan stringy biopsy fragments        ranging in length from 1.4 to 1.5 cm.  Additionally there are two        hemorrhagic tan filamentous fragments measuring 0.3 and 0.5 cm        respectively.  All submitted in block B1.   C. Specimen C is received in formalin designated "bx's right lateral        prostate #3" and consists of three tan stringy biopsy fragments        ranging in length from 1.1 to 2.3 cm in which the longest is        divided.  Additionally there is a tan hemorrhagic filamentous        fragment measuring 0.7 cm in length.  All submitted in block C1.   D. Specimen D is received in formalin designated "bx's right lateral        prostate #4" and consists of three tan stringy biopsy fragments        ranging in length from 0.7 to 1.0 cm.  Additionally there are five        hemorrhagic pink to tan filamentous fragments ranging from 0.1 to        0.5 cm.  All submitted in block D1.  CAC/stg      MICROSCOPIC DESCRIPTION:   A microscopic examination has been performed.      Clinical History: R97.20, PSA 4.87                                                    Electronically Signed By:                                                                     Martin GIL" Jr. Chun MD                                                                  05/14/2020 11:21      Result comment: The above 1 analytes were performed by [facility]   [address] [place] [address],[place] [id number]      ----------------------------------------------------------------------------------------------------------------------------------------------------------------------------------------------------------------------------------------------------------------------------------------------------------------------  History of Present Illness  General  Patient here for 6 month f/u with PSA prior. F/u low grade low volume prostate cancer on active survellaince by monitoring the numbers. Recent PSA by primary. PSA 4.9 a little higher. His number fluctuates, but has not gone up much at all. Still taking Flomax-no changes with urination.      Assessment:  CAP        Plan:  3 months with PSA prior   Continue Flomax 0.4 mg #90 with 3 refills  *medications reviewed-will continue Flomax.      Last visit below:  Patient presents today for 2 month f/u with PSA prior. His numbers look good. Diagnosed  with low risk prostate cancer a few months ago with active survellaince by monitoring the numbers. Has weak stream, but about the same. Taking Flomax to help him void better. Reports he is taking otc supplement once weekly for prostate-discussed.   Patient would like to wait 6 months for f/u.      Assessment:  CAP  BPH  Hx of elevated PSA     Plan:  PSA in 6 months with PSA prior   Continue Flomax 0.4 mg daily-new rx #90 with 11 refills.      Last clinic note 06/03/2020  60-year-old male here today for discussion further on recent diagnosis of very low risk prostate cancer [place] 3+3 and one at 12 cores with a PSA of less than 10. He is interested in focal ablation.     Assessment  Very low Risk prostate cancer  Luts     Plan  We again discussed all the treatment modalities in detail. Also offer him genetic testing  such as for MDX. We again reviewed asked surveillance  Explained how total gland ablation is performed  At this time we'll just repeat a PSA in 2 months. We will trend and based on this will get a prostate MRI at one year of diagnosis he changes his mind on treatment program  -------------------------------------------------------------------------------------------------------------------------------------------------------------------------------------------------------------------------------------------------------------------------------------  Above note of Feb. 8, 2021 by Dr. YEN Martínez     Ref Range    0.000-4.100  ng/mL   Feb 5 2021 15:16 C 4.950 H    Aug 3 2020 09:49 C 3.850   May 4 2020 09:40 C 4.870 H    Jan 7 2020 09:17 C 3.920 H    Mar 29 2019 13:06 C 3.640 H    Feb 2 2018 10:57 C 2.500 Maar 20 2017 08:30 C 2.800   Jan 9 2015 14:07 C 1.520   ---------------------------------------------------------------------------------------------     PSA was 4.060 on May 10, 2021  PSA was 5.200 on September 28, 2021  PSA was 2.390 on June 8, 2022  PSA was 2.520 on December 13, 2022     Mr. Dillon is former patient of Dr. Jackson Martínez who found him to have well differentiated prostate cancer and the patient has been managed conservatively for that.  Patient was treated by me for stone several years ago.  His wife is Elba Ellington and his sister is Ernesto Almanzar who were former employees.  Patient also has blood in the urine and low back pain.  He has passed a few clots in his urine in recent past.  He has had 2 or 3 stones in the past but does not think he has had any since around 2005. The back pain that he has is primarily in the left lower back.  Urine flow has been somewhat slower recently also.  The prostate was reported as being 39 grams in size.  Does not void as well as he used to as his urine flow has decreased in recent past seemingly. (September 28, 2021)    ----------------------------------------------------------------------------------------------------------------------------------------------------------------------------------------------------------------------------------------------------------------     Follow up in 20 days (on 11/1/2021) for Pre op work up, CBC, BMP, COVID, EKG and KUB prior to visit.  Discussed cystoscopy findings with patient.  Recommended TURP and laser lithotripsy of bladder stones.  I think the grade 3 prostate cancer will still be managed conservatively and patient is agreeable with that plan.  He was given 3 days Cipro 500 mg b.i.d. to cover instrumentation.  Urine sent for culture.  (October 12, 2021)     Patient returned today for his outpatient workup in anticipation of having TURP and laser lithotripsy of large bladder stone Wednesday November 3rd.  He has not seen a lot of blood since he got over the cysto.  He has had no worsening bladder outlet obstructive symptoms.  Patient agreeable to proceeding with TURP and laser lithotripsy of bladder stone on Wednesday under general anesthesia.  (November 1, 2021)                 Plan:    patient agreeable to TURP and laser lithotripsy of large bladder stone on Wednesday November 3rd.  Urine sent for culture to make sure there is no infection.  Last culture was negative.  Instructions for admission to outpatient surgery Wednesday given by Ms. Lemus and understood by patient.  He understands he will have to spend at least 1 night in the hospital.*                Other Notes     All notes        Instructions     Patient agreeable to TURP and laser lithotripsy of large bladder stone on Wednesday November 3rd.  Urine sent for culture to make sure there is no infection.  Last culture was negative.  Instructions for admission to outpatient surgery Wednesday given by Ms. Lemus and understood by patient.  He understands he will have to spend at least 1 night in the hospital  He will take  morning medicine with a small amount of water prior to leaving home.     ------------------------------------------------------------------------------------------------------------------------------------------------------------------------------------------------------------------------------  Mr. Donita Song underwent TURP on November 3.  He has done very well.  There is no cancer in the path report that he still needs to be followed for well differentiated prostate cancer that was moved but needle biopsies previously.  He is pleased with the results of surgery.  Nocturia times 0 and has an excellent stream.  He has had no recent visible blood in the urine.  Now 5 weeks postop.  He has no new complaints.  (December 8, 2021)      Mr. Dillon is in for follow-up of prostate cancer.  He was originally found to have well differentiated prostate cancer by Dr. Martínez in 2020. He did not have any cancer in prostate chips when he had TURP November 3, 2021. He has continued to void very well.  Can sleep all night usually without having to get up to void.  No bleeding and no new  problems.  (June 8, 2022)     Mr. Dillon is in for his 6 month follow-up of well differentiated prostate cancer.  Previous biopsies had demonstrated [place]'s grade 3+3=6 adenocarcinoma but no prostate chips had cancer present when he had his TURP slightly over 1 year ago.  He continues to void well and can sleep all night without having to get up to void typically.  No new medical problems. [December 13, 2022]  Review of Systems      Objective:      Physical Exam  Constitutional:       Appearance: Normal appearance. He is normal weight.   Genitourinary:     Prostate: Normal.      Rectum: Normal.      Comments: Prostate gland is 25-30 g smooth firm and symmetrical  Neurological:      Mental Status: He is alert.   Psychiatric:         Mood and Affect: Mood normal.         Behavior: Behavior normal.     Urinalysis revealed a few epithelial  cells with occasional pus.  The dipstick was negative with pH 6.0 and specific gravity 1.025  Assessment:       Problem List Items Addressed This Visit    None  Visit Diagnoses       Malignant neoplasm of prostate    -  Primary    Relevant Orders    PSA, Total (Diagnostic)    Family history of prostate cancer        History of nephrolithiasis        Chronic prostatitis        Essential hypertension                Plan:         Patient is doing well clinically and voiding very well.  PSA of 2.520 reported to patient.  Nothing that would indicate activity of prostate cancer.  Six month appointment with PSA or sooner for problems.

## 2022-12-14 NOTE — PATIENT INSTRUCTIONS
Patient is doing well clinically and voiding very well.  PSA of 2.520 reported to patient.  Nothing that would indicate activity of prostate cancer.  Six month appointment with PSA or sooner for problems.

## 2023-03-03 ENCOUNTER — OFFICE VISIT (OUTPATIENT)
Dept: FAMILY MEDICINE | Facility: CLINIC | Age: 63
End: 2023-03-03
Payer: COMMERCIAL

## 2023-03-03 VITALS
BODY MASS INDEX: 35.16 KG/M2 | TEMPERATURE: 99 F | WEIGHT: 224 LBS | SYSTOLIC BLOOD PRESSURE: 128 MMHG | HEIGHT: 67 IN | DIASTOLIC BLOOD PRESSURE: 86 MMHG

## 2023-03-03 DIAGNOSIS — I10 HYPERTENSION, ESSENTIAL: Primary | ICD-10-CM

## 2023-03-03 DIAGNOSIS — R21 RASH AND NONSPECIFIC SKIN ERUPTION: ICD-10-CM

## 2023-03-03 DIAGNOSIS — M25.50 ARTHRALGIA, UNSPECIFIED JOINT: ICD-10-CM

## 2023-03-03 LAB
ALBUMIN SERPL BCP-MCNC: 3.6 G/DL (ref 3.5–5)
ALBUMIN/GLOB SERPL: 0.9 {RATIO}
ALP SERPL-CCNC: 85 U/L (ref 45–115)
ALT SERPL W P-5'-P-CCNC: 21 U/L (ref 16–61)
ANION GAP SERPL CALCULATED.3IONS-SCNC: 7 MMOL/L (ref 7–16)
AST SERPL W P-5'-P-CCNC: 17 U/L (ref 15–37)
BILIRUB SERPL-MCNC: 0.3 MG/DL (ref ?–1.2)
BUN SERPL-MCNC: 21 MG/DL (ref 7–18)
BUN/CREAT SERPL: 13 (ref 6–20)
CALCIUM SERPL-MCNC: 8.9 MG/DL (ref 8.5–10.1)
CHLORIDE SERPL-SCNC: 107 MMOL/L (ref 98–107)
CO2 SERPL-SCNC: 30 MMOL/L (ref 21–32)
CREAT SERPL-MCNC: 1.58 MG/DL (ref 0.7–1.3)
EGFR (NO RACE VARIABLE) (RUSH/TITUS): 49 ML/MIN/1.73M²
GLOBULIN SER-MCNC: 3.8 G/DL (ref 2–4)
GLUCOSE SERPL-MCNC: 73 MG/DL (ref 74–106)
POTASSIUM SERPL-SCNC: 4.3 MMOL/L (ref 3.5–5.1)
PROT SERPL-MCNC: 7.4 G/DL (ref 6.4–8.2)
SODIUM SERPL-SCNC: 140 MMOL/L (ref 136–145)

## 2023-03-03 PROCEDURE — 99213 PR OFFICE/OUTPT VISIT, EST, LEVL III, 20-29 MIN: ICD-10-PCS | Mod: ,,, | Performed by: NURSE PRACTITIONER

## 2023-03-03 PROCEDURE — 80053 COMPREHEN METABOLIC PANEL: CPT | Mod: ,,, | Performed by: CLINICAL MEDICAL LABORATORY

## 2023-03-03 PROCEDURE — 99213 OFFICE O/P EST LOW 20 MIN: CPT | Mod: ,,, | Performed by: NURSE PRACTITIONER

## 2023-03-03 PROCEDURE — 80053 COMPREHENSIVE METABOLIC PANEL: ICD-10-PCS | Mod: ,,, | Performed by: CLINICAL MEDICAL LABORATORY

## 2023-03-03 RX ORDER — LISINOPRIL 40 MG/1
40 TABLET ORAL DAILY
Qty: 90 TABLET | Refills: 1 | Status: SHIPPED | OUTPATIENT
Start: 2023-03-03 | End: 2023-09-01 | Stop reason: SDUPTHER

## 2023-03-03 RX ORDER — MELOXICAM 7.5 MG/1
7.5 TABLET ORAL DAILY PRN
Qty: 90 TABLET | Refills: 1 | Status: SHIPPED | OUTPATIENT
Start: 2023-03-03 | End: 2023-09-01

## 2023-03-03 RX ORDER — HYDROCHLOROTHIAZIDE 12.5 MG/1
12.5 CAPSULE ORAL DAILY
Qty: 90 CAPSULE | Refills: 1 | Status: SHIPPED | OUTPATIENT
Start: 2023-03-03 | End: 2023-09-01 | Stop reason: SDUPTHER

## 2023-03-03 NOTE — PROGRESS NOTES
Subjective:       Patient ID: Ernesto Dillon is a 63 y.o. male.    Chief Complaint: refills (Patient here for rx refills only)    Medication refills and labs  Rash- chronic and pruritic  Review of Systems   Constitutional:  Negative for appetite change, fatigue and fever.   HENT:  Negative for nasal congestion, ear pain and sore throat.    Eyes:  Negative for pain, discharge and itching.   Respiratory:  Negative for cough and shortness of breath.    Cardiovascular:  Negative for chest pain and leg swelling.   Gastrointestinal:  Negative for abdominal pain, change in bowel habit, nausea, vomiting and change in bowel habit.   Endocrine: Negative for cold intolerance, heat intolerance, polydipsia, polyphagia and polyuria.   Musculoskeletal:  Negative for back pain, gait problem and neck pain.   Integumentary:  Negative for rash and wound.   Neurological:  Negative for dizziness, weakness and headaches.   All other systems reviewed and are negative.      Objective:      Physical Exam  Vitals and nursing note reviewed.   Constitutional:       General: He is not in acute distress.     Appearance: Normal appearance. He is not ill-appearing, toxic-appearing or diaphoretic.   HENT:      Head: Normocephalic.      Right Ear: Tympanic membrane, ear canal and external ear normal.      Left Ear: Tympanic membrane, ear canal and external ear normal.      Nose: Nose normal. No congestion or rhinorrhea.      Mouth/Throat:      Mouth: Mucous membranes are moist.      Pharynx: No oropharyngeal exudate or posterior oropharyngeal erythema.   Eyes:      General: No scleral icterus.        Right eye: No discharge.         Left eye: No discharge.      Extraocular Movements: Extraocular movements intact.      Conjunctiva/sclera: Conjunctivae normal.      Pupils: Pupils are equal, round, and reactive to light.   Neck:      Vascular: No carotid bruit.   Cardiovascular:      Rate and Rhythm: Normal rate and regular rhythm.       Pulses: Normal pulses.      Heart sounds: Normal heart sounds. No murmur heard.  Pulmonary:      Effort: Pulmonary effort is normal. No respiratory distress.      Breath sounds: Normal breath sounds. No wheezing, rhonchi or rales.   Musculoskeletal:         General: Normal range of motion.      Cervical back: Neck supple. No tenderness.      Right lower leg: No edema.      Left lower leg: No edema.   Lymphadenopathy:      Cervical: No cervical adenopathy.   Skin:     General: Skin is warm and dry.      Capillary Refill: Capillary refill takes less than 2 seconds.      Findings: Rash present.      Comments: Hyperpigmented, dry scaly patches noted to both forearms   Neurological:      Mental Status: He is alert and oriented to person, place, and time.   Psychiatric:         Mood and Affect: Mood normal.         Behavior: Behavior normal.         Thought Content: Thought content normal.         Judgment: Judgment normal.          Assessment:       1. Hypertension, essential    2. Arthralgia, unspecified joint    3. Rash and nonspecific skin eruption        Plan:   Hypertension, essential  -     Comprehensive Metabolic Panel; Future; Expected date: 03/03/2023  -     lisinopriL (PRINIVIL,ZESTRIL) 40 MG tablet; Take 1 tablet (40 mg total) by mouth once daily.  Dispense: 90 tablet; Refill: 1  -     hydroCHLOROthiazide (MICROZIDE) 12.5 mg capsule; Take 1 capsule (12.5 mg total) by mouth once daily.  Dispense: 90 capsule; Refill: 1    Arthralgia, unspecified joint  -     meloxicam (MOBIC) 7.5 MG tablet; Take 1 tablet (7.5 mg total) by mouth daily as needed for Pain. One or 2 daily p.c. when necessary for inflammation  Dispense: 90 tablet; Refill: 1    Rash and nonspecific skin eruption  -     Ambulatory referral/consult to Dermatology; Future; Expected date: 03/10/2023         Risks, benefits, and side effects were discussed with the patient. All questions were answered to the fullest satisfaction of the patient, and pt  verbalized understanding and agreement to treatment plan. Pt was to call with any new or worsening symptoms, or present to the ER

## 2023-03-07 ENCOUNTER — OFFICE VISIT (OUTPATIENT)
Dept: DERMATOLOGY | Facility: CLINIC | Age: 63
End: 2023-03-07
Payer: COMMERCIAL

## 2023-03-07 DIAGNOSIS — L30.9 DERMATITIS, UNSPECIFIED: Primary | ICD-10-CM

## 2023-03-07 DIAGNOSIS — L30.0 NUMMULAR ECZEMA: ICD-10-CM

## 2023-03-07 DIAGNOSIS — R21 RASH AND NONSPECIFIC SKIN ERUPTION: ICD-10-CM

## 2023-03-07 PROCEDURE — 99204 OFFICE O/P NEW MOD 45 MIN: CPT | Mod: ,,, | Performed by: STUDENT IN AN ORGANIZED HEALTH CARE EDUCATION/TRAINING PROGRAM

## 2023-03-07 PROCEDURE — 99204 PR OFFICE/OUTPT VISIT, NEW, LEVL IV, 45-59 MIN: ICD-10-PCS | Mod: ,,, | Performed by: STUDENT IN AN ORGANIZED HEALTH CARE EDUCATION/TRAINING PROGRAM

## 2023-03-07 RX ORDER — TRIAMCINOLONE ACETONIDE 1 MG/G
OINTMENT TOPICAL 2 TIMES DAILY
Qty: 453.6 G | Refills: 5 | Status: SHIPPED | OUTPATIENT
Start: 2023-03-07 | End: 2024-03-25

## 2023-03-07 NOTE — PROGRESS NOTES
[facility]  Randy Price MD    [address], [place] [address]  [phone]    Fax: [phone]    Patient Name: Ernesto Dillon  Medical Record Number: [id number]  PCP: Primary Doctor No  Age: 63 y.o. : 1960  Contact: [phone] (home)     CC: rash on bilateral arms and neck  History of Present Illness:     Ernesto Dillon is a 63 y.o.  male with no history of skin cancer who presents to clinic today for rash on bilateral arms and neck.  This has been present intermittently for 3 years. Symptoms include pruritus. Previous treatments include OTC cortisone cream. Other concerns today are none.      The patient has no other concerns today.    Review of Systems:     Unremarkable other than mentioned above.     Physical Exam:     General: Relaxed, oriented, alert    Skin examination of the scalp, face, neck, chest, back, abdomen, upper extremities and lower extremities were normal except for as listed below      Assessment and Plan:     1. Dermatitis Unspecified  - annular hyperpigmented patches on posterior neck and bilateral forearms   DDx: nummular eczema vs brachial radial pruritus with LSC    He has pinched nerve in neck.     Plan:   - TAC 0.1% ointment  - if not improved with TAC will try gabapentin and Sarna lotion.     Counseling  I counseled the patient regarding the following:  Skin care: Patient instructed to use gentle skin care including dove unscented soap, CeraVe moisturizing cream, and fragrance free laundry detergent.  Expectations: The patient understands that there is not a definitive diagnosis at this time. Further testing or empiric therapy may be necessary to diagnose and improve the condition.  Contact office if: The patient develops a fever, or rash dramatically worsens despite treatment.      Return to clinic in 2 months.    AVS printed with patient instructions     Randy Price MD   Mohs Surgery/Dermatologic  Oncology  Dermatology

## 2023-04-12 ENCOUNTER — OFFICE VISIT (OUTPATIENT)
Dept: FAMILY MEDICINE | Facility: CLINIC | Age: 63
End: 2023-04-12
Payer: COMMERCIAL

## 2023-04-12 VITALS
OXYGEN SATURATION: 99 % | HEART RATE: 70 BPM | WEIGHT: 225 LBS | SYSTOLIC BLOOD PRESSURE: 140 MMHG | RESPIRATION RATE: 17 BRPM | BODY MASS INDEX: 35.31 KG/M2 | DIASTOLIC BLOOD PRESSURE: 88 MMHG | TEMPERATURE: 99 F | HEIGHT: 67 IN

## 2023-04-12 DIAGNOSIS — M10.9 GOUT, UNSPECIFIED CAUSE, UNSPECIFIED CHRONICITY, UNSPECIFIED SITE: Primary | ICD-10-CM

## 2023-04-12 PROCEDURE — 99214 PR OFFICE/OUTPT VISIT, EST, LEVL IV, 30-39 MIN: ICD-10-PCS | Mod: 25,,, | Performed by: FAMILY MEDICINE

## 2023-04-12 PROCEDURE — 99214 OFFICE O/P EST MOD 30 MIN: CPT | Mod: 25,,, | Performed by: FAMILY MEDICINE

## 2023-04-12 PROCEDURE — 96372 PR INJECTION,THERAP/PROPH/DIAG2ST, IM OR SUBCUT: ICD-10-PCS | Mod: ,,, | Performed by: FAMILY MEDICINE

## 2023-04-12 PROCEDURE — 96372 THER/PROPH/DIAG INJ SC/IM: CPT | Mod: ,,, | Performed by: FAMILY MEDICINE

## 2023-04-12 RX ORDER — TIZANIDINE 4 MG/1
4 TABLET ORAL 3 TIMES DAILY PRN
Qty: 20 TABLET | Refills: 0 | Status: SHIPPED | OUTPATIENT
Start: 2023-04-12 | End: 2023-04-22

## 2023-04-12 RX ORDER — PREDNISONE 20 MG/1
20 TABLET ORAL DAILY
Qty: 5 TABLET | Refills: 0 | Status: SHIPPED | OUTPATIENT
Start: 2023-04-12 | End: 2023-04-17

## 2023-04-12 RX ORDER — COLCHICINE 0.6 MG/1
TABLET ORAL
Qty: 10 TABLET | Refills: 0 | Status: SHIPPED | OUTPATIENT
Start: 2023-04-12 | End: 2023-09-01 | Stop reason: SDUPTHER

## 2023-04-12 RX ORDER — DEXAMETHASONE SODIUM PHOSPHATE 4 MG/ML
4 INJECTION, SOLUTION INTRA-ARTICULAR; INTRALESIONAL; INTRAMUSCULAR; INTRAVENOUS; SOFT TISSUE
Status: COMPLETED | OUTPATIENT
Start: 2023-04-12 | End: 2023-04-12

## 2023-04-12 RX ORDER — KETOROLAC TROMETHAMINE 30 MG/ML
60 INJECTION, SOLUTION INTRAMUSCULAR; INTRAVENOUS
Status: COMPLETED | OUTPATIENT
Start: 2023-04-12 | End: 2023-04-12

## 2023-04-12 RX ADMIN — KETOROLAC TROMETHAMINE 60 MG: 30 INJECTION, SOLUTION INTRAMUSCULAR; INTRAVENOUS at 10:04

## 2023-04-12 RX ADMIN — DEXAMETHASONE SODIUM PHOSPHATE 4 MG: 4 INJECTION, SOLUTION INTRA-ARTICULAR; INTRALESIONAL; INTRAMUSCULAR; INTRAVENOUS; SOFT TISSUE at 10:04

## 2023-04-12 NOTE — PROGRESS NOTES
Subjective:       Patient ID: Ernesto Dillon is a 63 y.o. male.    Chief Complaint: Gout (Gout flare in left foot for 3 wks. )    HPI  Review of Systems   Constitutional:  Negative for activity change, appetite change, chills, diaphoresis, fatigue, fever and unexpected weight change.   HENT:  Negative for nasal congestion, dental problem, drooling, ear discharge, ear pain, facial swelling, hearing loss, mouth sores, nosebleeds, postnasal drip, rhinorrhea, sinus pressure/congestion, sneezing, sore throat, tinnitus, trouble swallowing, voice change and goiter.    Eyes:  Negative for photophobia, pain, discharge, redness, itching and visual disturbance.   Respiratory:  Negative for apnea, cough, choking, chest tightness, shortness of breath, wheezing and stridor.    Cardiovascular:  Negative for chest pain, palpitations, leg swelling and claudication.   Gastrointestinal:  Negative for abdominal distention, abdominal pain, anal bleeding, blood in stool, change in bowel habit, constipation, diarrhea, nausea, vomiting, reflux, fecal incontinence and change in bowel habit.   Endocrine: Negative for cold intolerance, heat intolerance, polydipsia, polyphagia and polyuria.   Genitourinary:  Negative for bladder incontinence, decreased urine volume, difficulty urinating, discharge, dysuria, enuresis, erectile dysfunction, flank pain, frequency, genital sores, hematuria, penile pain, testicular pain and urgency.   Musculoskeletal:  Positive for arthralgias, joint swelling and myalgias. Negative for back pain, gait problem, leg pain, neck pain, neck stiffness and joint deformity.   Integumentary:  Negative for pallor, rash, wound and mole/lesion.   Allergic/Immunologic: Negative for environmental allergies, food allergies and frequent infections.   Neurological:  Negative for dizziness, vertigo, tremors, seizures, syncope, facial asymmetry, speech difficulty, weakness, light-headedness, numbness, headaches, coordination  difficulties, memory loss and coordination difficulties.   Hematological:  Negative for adenopathy. Does not bruise/bleed easily.   Psychiatric/Behavioral:  Negative for agitation, behavioral problems, confusion, decreased concentration, dysphoric mood, hallucinations, self-injury, sleep disturbance and suicidal ideas. The patient is not nervous/anxious and is not hyperactive.        Objective:      Physical Exam  Vitals reviewed.   Constitutional:       Appearance: Normal appearance. He is normal weight.   HENT:      Head: Normocephalic and atraumatic.      Right Ear: Tympanic membrane and ear canal normal.      Left Ear: Tympanic membrane, ear canal and external ear normal.      Nose: Nose normal.      Mouth/Throat:      Mouth: Mucous membranes are moist.      Pharynx: Oropharynx is clear.   Eyes:      Extraocular Movements: Extraocular movements intact.      Conjunctiva/sclera: Conjunctivae normal.      Pupils: Pupils are equal, round, and reactive to light.   Cardiovascular:      Rate and Rhythm: Normal rate and regular rhythm.      Pulses: Normal pulses.      Heart sounds: Normal heart sounds.   Pulmonary:      Effort: Pulmonary effort is normal.      Breath sounds: Normal breath sounds.   Abdominal:      General: Abdomen is flat. Bowel sounds are normal.      Palpations: Abdomen is soft.   Musculoskeletal:         General: Swelling, tenderness and signs of injury present. Normal range of motion.      Cervical back: Normal range of motion and neck supple.   Skin:     General: Skin is warm and dry.   Neurological:      General: No focal deficit present.      Mental Status: He is alert and oriented to person, place, and time. Mental status is at baseline.   Psychiatric:         Mood and Affect: Mood normal.         Behavior: Behavior normal.         Thought Content: Thought content normal.         Judgment: Judgment normal.       Assessment:       1. Gout, unspecified cause, unspecified chronicity, unspecified  site        Plan:     Gout, unspecified cause, unspecified chronicity, unspecified site  -     dexAMETHasone injection 4 mg  -     ketorolac injection 60 mg    Other orders  -     predniSONE (DELTASONE) 20 MG tablet; Take 1 tablet (20 mg total) by mouth once daily. for 5 days  Dispense: 5 tablet; Refill: 0  -     tiZANidine (ZANAFLEX) 4 MG tablet; Take 1 tablet (4 mg total) by mouth 3 (three) times daily as needed (spasm).  Dispense: 20 tablet; Refill: 0  -     colchicine (COLCRYS) 0.6 mg tablet; Take 2 tablets by mouth then wait two hours, if still in pain take 1 tablet by mouth, no more tablets for 24 hours.  Dispense: 10 tablet; Refill: 0

## 2023-04-14 ENCOUNTER — OFFICE VISIT (OUTPATIENT)
Dept: FAMILY MEDICINE | Facility: CLINIC | Age: 63
End: 2023-04-14
Payer: COMMERCIAL

## 2023-04-14 VITALS
TEMPERATURE: 98 F | WEIGHT: 228 LBS | SYSTOLIC BLOOD PRESSURE: 120 MMHG | OXYGEN SATURATION: 97 % | HEART RATE: 80 BPM | BODY MASS INDEX: 34.56 KG/M2 | RESPIRATION RATE: 18 BRPM | HEIGHT: 68 IN | DIASTOLIC BLOOD PRESSURE: 84 MMHG

## 2023-04-14 DIAGNOSIS — M62.838 MUSCLE SPASM: Primary | ICD-10-CM

## 2023-04-14 DIAGNOSIS — M79.604 PAIN OF RIGHT LOWER EXTREMITY: ICD-10-CM

## 2023-04-14 LAB
ANION GAP SERPL CALCULATED.3IONS-SCNC: 10 MMOL/L (ref 7–16)
BUN SERPL-MCNC: 36 MG/DL (ref 7–18)
BUN/CREAT SERPL: 24 (ref 6–20)
CALCIUM SERPL-MCNC: 9 MG/DL (ref 8.5–10.1)
CHLORIDE SERPL-SCNC: 104 MMOL/L (ref 98–107)
CO2 SERPL-SCNC: 29 MMOL/L (ref 21–32)
CREAT SERPL-MCNC: 1.53 MG/DL (ref 0.7–1.3)
EGFR (NO RACE VARIABLE) (RUSH/TITUS): 51 ML/MIN/1.73M²
GLUCOSE SERPL-MCNC: 106 MG/DL (ref 74–106)
POTASSIUM SERPL-SCNC: 4 MMOL/L (ref 3.5–5.1)
SODIUM SERPL-SCNC: 139 MMOL/L (ref 136–145)

## 2023-04-14 PROCEDURE — 80048 BASIC METABOLIC PNL TOTAL CA: CPT | Mod: ,,, | Performed by: CLINICAL MEDICAL LABORATORY

## 2023-04-14 PROCEDURE — 96372 PR INJECTION,THERAP/PROPH/DIAG2ST, IM OR SUBCUT: ICD-10-PCS | Mod: ,,, | Performed by: FAMILY MEDICINE

## 2023-04-14 PROCEDURE — 99214 PR OFFICE/OUTPT VISIT, EST, LEVL IV, 30-39 MIN: ICD-10-PCS | Mod: 25,,, | Performed by: FAMILY MEDICINE

## 2023-04-14 PROCEDURE — 80048 BASIC METABOLIC PANEL: ICD-10-PCS | Mod: ,,, | Performed by: CLINICAL MEDICAL LABORATORY

## 2023-04-14 PROCEDURE — 96372 THER/PROPH/DIAG INJ SC/IM: CPT | Mod: ,,, | Performed by: FAMILY MEDICINE

## 2023-04-14 PROCEDURE — 99214 OFFICE O/P EST MOD 30 MIN: CPT | Mod: 25,,, | Performed by: FAMILY MEDICINE

## 2023-04-14 RX ORDER — CYCLOBENZAPRINE HCL 10 MG
10 TABLET ORAL 3 TIMES DAILY PRN
Qty: 20 TABLET | Refills: 0 | Status: SHIPPED | OUTPATIENT
Start: 2023-04-14 | End: 2024-03-25

## 2023-04-14 RX ORDER — KETOROLAC TROMETHAMINE 30 MG/ML
60 INJECTION, SOLUTION INTRAMUSCULAR; INTRAVENOUS
Status: COMPLETED | OUTPATIENT
Start: 2023-04-14 | End: 2023-04-14

## 2023-04-14 RX ORDER — DEXAMETHASONE SODIUM PHOSPHATE 4 MG/ML
4 INJECTION, SOLUTION INTRA-ARTICULAR; INTRALESIONAL; INTRAMUSCULAR; INTRAVENOUS; SOFT TISSUE
Status: COMPLETED | OUTPATIENT
Start: 2023-04-14 | End: 2023-04-14

## 2023-04-14 RX ORDER — GABAPENTIN 300 MG/1
300 CAPSULE ORAL 3 TIMES DAILY PRN
Qty: 90 CAPSULE | Refills: 1 | Status: SHIPPED | OUTPATIENT
Start: 2023-04-14

## 2023-04-14 RX ORDER — METHYLPREDNISOLONE 4 MG/1
TABLET ORAL
Qty: 21 EACH | Refills: 0 | Status: SHIPPED | OUTPATIENT
Start: 2023-04-14 | End: 2023-06-13 | Stop reason: ALTCHOICE

## 2023-04-14 RX ADMIN — KETOROLAC TROMETHAMINE 60 MG: 30 INJECTION, SOLUTION INTRAMUSCULAR; INTRAVENOUS at 05:04

## 2023-04-14 RX ADMIN — DEXAMETHASONE SODIUM PHOSPHATE 4 MG: 4 INJECTION, SOLUTION INTRA-ARTICULAR; INTRALESIONAL; INTRAMUSCULAR; INTRAVENOUS; SOFT TISSUE at 05:04

## 2023-04-14 NOTE — PROGRESS NOTES
Subjective:       Patient ID: Ernseto Dillon is a 63 y.o. male.    Chief Complaint: Leg Pain (Right leg pain x 1 week )    Pt with right thigh and knee pain x 1 week. Reports cramping pain, no swelling. Has had similar pain before.     Review of Systems   Constitutional:  Negative for activity change, appetite change, chills, diaphoresis, fatigue, fever and unexpected weight change.   HENT:  Negative for nasal congestion, dental problem, drooling, ear discharge, ear pain, facial swelling, hearing loss, mouth sores, nosebleeds, postnasal drip, rhinorrhea, sinus pressure/congestion, sneezing, sore throat, tinnitus, trouble swallowing, voice change and goiter.    Eyes:  Negative for photophobia, pain, discharge, redness, itching and visual disturbance.   Respiratory:  Negative for apnea, cough, choking, chest tightness, shortness of breath, wheezing and stridor.    Cardiovascular:  Negative for chest pain, palpitations, leg swelling and claudication.   Gastrointestinal:  Negative for abdominal distention, abdominal pain, anal bleeding, blood in stool, change in bowel habit, constipation, diarrhea, nausea, vomiting, reflux, fecal incontinence and change in bowel habit.   Endocrine: Negative for cold intolerance, heat intolerance, polydipsia, polyphagia and polyuria.   Genitourinary:  Negative for bladder incontinence, decreased urine volume, difficulty urinating, discharge, dysuria, enuresis, erectile dysfunction, flank pain, frequency, genital sores, hematuria, penile pain, testicular pain and urgency.   Musculoskeletal:  Positive for arthralgias, leg pain and myalgias. Negative for back pain, gait problem, joint swelling, neck pain, neck stiffness and joint deformity.   Integumentary:  Negative for pallor, rash, wound and mole/lesion.   Allergic/Immunologic: Negative for environmental allergies, food allergies and frequent infections.   Neurological:  Negative for dizziness, vertigo, tremors, seizures,  syncope, facial asymmetry, speech difficulty, weakness, light-headedness, numbness, headaches, coordination difficulties, memory loss and coordination difficulties.   Hematological:  Negative for adenopathy. Does not bruise/bleed easily.   Psychiatric/Behavioral:  Negative for agitation, behavioral problems, confusion, decreased concentration, dysphoric mood, hallucinations, self-injury, sleep disturbance and suicidal ideas. The patient is not nervous/anxious and is not hyperactive.        Objective:      Physical Exam  Vitals reviewed.   Constitutional:       Appearance: Normal appearance. He is normal weight.   HENT:      Head: Normocephalic and atraumatic.      Right Ear: Tympanic membrane and ear canal normal.      Left Ear: Tympanic membrane, ear canal and external ear normal.      Nose: Nose normal.      Mouth/Throat:      Mouth: Mucous membranes are moist.      Pharynx: Oropharynx is clear.   Eyes:      Extraocular Movements: Extraocular movements intact.      Conjunctiva/sclera: Conjunctivae normal.      Pupils: Pupils are equal, round, and reactive to light.   Cardiovascular:      Rate and Rhythm: Normal rate and regular rhythm.      Pulses: Normal pulses.      Heart sounds: Normal heart sounds.   Pulmonary:      Effort: Pulmonary effort is normal.      Breath sounds: Normal breath sounds.   Abdominal:      General: Abdomen is flat. Bowel sounds are normal.      Palpations: Abdomen is soft.   Musculoskeletal:         General: Swelling and tenderness present. Normal range of motion.      Cervical back: Normal range of motion and neck supple.   Skin:     General: Skin is warm and dry.   Neurological:      General: No focal deficit present.      Mental Status: He is alert and oriented to person, place, and time. Mental status is at baseline.   Psychiatric:         Mood and Affect: Mood normal.         Behavior: Behavior normal.         Thought Content: Thought content normal.         Judgment: Judgment normal.        Assessment:       1. Muscle spasm    2. Pain of right lower extremity        Plan:     Muscle spasm  -     dexAMETHasone injection 4 mg  -     ketorolac injection 60 mg  -     Basic Metabolic Panel; Future; Expected date: 04/14/2023    Pain of right lower extremity    Other orders  -     methylPREDNISolone (MEDROL DOSEPACK) 4 mg tablet; use as directed  Dispense: 21 each; Refill: 0  -     cyclobenzaprine (FLEXERIL) 10 MG tablet; Take 1 tablet (10 mg total) by mouth 3 (three) times daily as needed for Muscle spasms.  Dispense: 20 tablet; Refill: 0  -     gabapentin (NEURONTIN) 300 MG capsule; Take 1 capsule (300 mg total) by mouth 3 (three) times daily as needed.  Dispense: 90 capsule; Refill: 1

## 2023-06-13 ENCOUNTER — OFFICE VISIT (OUTPATIENT)
Dept: UROLOGY | Facility: CLINIC | Age: 63
End: 2023-06-13
Payer: COMMERCIAL

## 2023-06-13 VITALS
HEART RATE: 76 BPM | WEIGHT: 222 LBS | SYSTOLIC BLOOD PRESSURE: 128 MMHG | DIASTOLIC BLOOD PRESSURE: 80 MMHG | HEIGHT: 67 IN | BODY MASS INDEX: 34.84 KG/M2

## 2023-06-13 DIAGNOSIS — C61 MALIGNANT NEOPLASM OF PROSTATE: Primary | ICD-10-CM

## 2023-06-13 DIAGNOSIS — N32.0 BLADDER OUTLET OBSTRUCTION: ICD-10-CM

## 2023-06-13 DIAGNOSIS — Z87.898 HISTORY OF ELEVATED PSA: ICD-10-CM

## 2023-06-13 DIAGNOSIS — N41.1 CHRONIC PROSTATITIS: ICD-10-CM

## 2023-06-13 PROCEDURE — 99213 OFFICE O/P EST LOW 20 MIN: CPT | Mod: PBBFAC | Performed by: UROLOGY

## 2023-06-13 PROCEDURE — 99213 PR OFFICE/OUTPT VISIT, EST, LEVL III, 20-29 MIN: ICD-10-PCS | Mod: S$PBB,,, | Performed by: UROLOGY

## 2023-06-13 PROCEDURE — 99213 OFFICE O/P EST LOW 20 MIN: CPT | Mod: S$PBB,,, | Performed by: UROLOGY

## 2023-06-13 NOTE — PATIENT INSTRUCTIONS
Patient doing well clinically.  PSA was done and returned after he left the office.  It was 2.420 which is very similar to what it has been in recent past.  We will see in 6 months with another PSA or sooner for problems.

## 2023-06-13 NOTE — PROGRESS NOTES
Subjective     Patient ID: Amrita Banerjee is a 63 y.o. male.    Chief Complaint: Prostate Cancer (6 month with PSA c61 and office visit)      PATIENT: AMRITA BANERJEE               CASE NUMBER:     [id number]        1960 AGE: 60 yrs SEX: M          ACCOUNT NUMBER:   [id number]     RACE:    Black                                  UNIT NUMBER:     [id number]     ATTENDING                                       DATE COLLECTED:   2020              Jackson Martínez M.D.     PHYSICIAN:                                                     DATE RECEIVED: 2020                                                     DATE REPORTED: 2020       DIAGNOSIS:   A.  Left lateral prostate #1, biopsies:          - Focal high grade prostatic intraepithelial neoplasia         - Mild chronic prostatitis          - See comment   B.  Left lateral prostate #2, biopsies:          - Focal high grade prostatic intraepithelial neoplasia         - Mild chronic prostatitis          - See comment   C.  Right lateral prostate #3, biopsies:          - [place]'s grade 3+3=6 adenocarcinoma of the prostate involving         two of the seven biopsy            fragments (approximately 3% of the specimen)         - High grade prostatic intraepithelial neoplasia         - Mild chronic prostatitis          - See comment   D.  Right lateral prostate #4, biopsies:          - Focal high grade prostatic intraepithelial neoplasia         - See comment      COMMENTS:       Properly controlled immunohistochemical cocktails for                     PIN 4 are performed on all four of the specimens. There                    is a lack of basal staining along with positive AMACR                     staining within the invasive foci of specimen C. This                     helps to confirm the diagnosis. There is also some                     AMACR staining within the areas of high grade                     prostatic intraepithelial  "neoplasia.                        Dr. Koehler has reviewed a selected portion of this   case,                     and he agrees with the findings.      SPECIMEN SUBMITTED:   A.  Bx's left lateral prostate #1   B.  Bx's left lateral prostate #2   C.  Bx's right lateral prostate #3   D.  Bx's right lateral prostate #4      GROSS DESCRIPTION:   A. Specimen A is received in formalin designated "bx's left lateral        prostate #1" and consists of three tan stringy biopsy fragments        ranging in length from 0.9 to 1.5 cm.  Additionally there are two        hemorrhagic tan filamentous fragments measuring 1.0 and 1.4 cm        respectively.  All submitted in block A1.   B. Specimen B is received in formalin designated "bx's left lateral        prostate #2" and consists of three tan stringy biopsy fragments        ranging in length from 1.4 to 1.5 cm.  Additionally there are two        hemorrhagic tan filamentous fragments measuring 0.3 and 0.5 cm        respectively.  All submitted in block B1.   C. Specimen C is received in formalin designated "bx's right lateral        prostate #3" and consists of three tan stringy biopsy fragments        ranging in length from 1.1 to 2.3 cm in which the longest is        divided.  Additionally there is a tan hemorrhagic filamentous        fragment measuring 0.7 cm in length.  All submitted in block C1.   D. Specimen D is received in formalin designated "bx's right lateral        prostate #4" and consists of three tan stringy biopsy fragments        ranging in length from 0.7 to 1.0 cm.  Additionally there are five        hemorrhagic pink to tan filamentous fragments ranging from 0.1 to        0.5 cm.  All submitted in block D1.  CAC/stg      MICROSCOPIC DESCRIPTION:   A microscopic examination has been performed.      Clinical History: R97.20, PSA 4.87                                                    Electronically Signed By:                                                       "               Martin Chun Jr., MD                                                                  05/14/2020 11:21      Result comment: The above 1 analytes were performed by [facility]   [address] [place] [address],[place] [id number]      ----------------------------------------------------------------------------------------------------------------------------------------------------------------------------------------------------------------------------------------------------------------------------------------------------------------------  History of Present Illness  General  Patient here for 6 month f/u with PSA prior. F/u low grade low volume prostate cancer on active survellaince by monitoring the numbers. Recent PSA by primary. PSA 4.9 a little higher. His number fluctuates, but has not gone up much at all. Still taking Flomax-no changes with urination.      Assessment:  CAP        Plan:  3 months with PSA prior   Continue Flomax 0.4 mg #90 with 3 refills  *medications reviewed-will continue Flomax.      Last visit below:  Patient presents today for 2 month f/u with PSA prior. His numbers look good. Diagnosed  with low risk prostate cancer a few months ago with active survellaince by monitoring the numbers. Has weak stream, but about the same. Taking Flomax to help him void better. Reports he is taking otc supplement once weekly for prostate-discussed.   Patient would like to wait 6 months for f/u.      Assessment:  CAP  BPH  Hx of elevated PSA     Plan:  PSA in 6 months with PSA prior   Continue Flomax 0.4 mg daily-new rx #90 with 11 refills.      Last clinic note 06/03/2020  60-year-old male here today for discussion further on recent diagnosis of very low risk prostate cancer Elvie 3+3 and one at 12 cores with a PSA of less than 10. He is interested in focal ablation.     Assessment  Very low Risk prostate cancer  Luts     Plan  We again discussed all the treatment modalities in detail. Also offer  him genetic testing such as for MDX. We again reviewed asked surveillance  Explained how total gland ablation is performed  At this time we'll just repeat a PSA in 2 months. We will trend and based on this will get a prostate MRI at one year of diagnosis he changes his mind on treatment program  -------------------------------------------------------------------------------------------------------------------------------------------------------------------------------------------------------------------------------------------------------------------------------------  Above note of Feb. 8, 2021 by Dr. YEN Martínez     Ref Range    0.000-4.100  ng/mL   Feb 5 2021 15:16 C 4.950 H    Aug 3 2020 09:49 C 3.850   May 4 2020 09:40 C 4.870 H    Jan 7 2020 09:17 C 3.920 H    Mar 29 2019 13:06 C 3.640 H    Feb 2 2018 10:57 C 2.500 Maar 20 2017 08:30 C 2.800   Jan 9 2015 14:07 C 1.520   ---------------------------------------------------------------------------------------------     PSA was 4.060 on May 10, 2021  PSA was 5.200 on September 28, 2021  PSA was 2.390 on June 8, 2022  PSA was 2.520 on December 13, 2022     Mr. Dillon is former patient of Dr. Jackson Martínez who found him to have well differentiated prostate cancer and the patient has been managed conservatively for that.  Patient was treated by me for stone several years ago.  His wife is Elba Ellington and his sister is Ernesto Almanzar who were former employees.  Patient also has blood in the urine and low back pain.  He has passed a few clots in his urine in recent past.  He has had 2 or 3 stones in the past but does not think he has had any since around 2005. The back pain that he has is primarily in the left lower back.  Urine flow has been somewhat slower recently also.  The prostate was reported as being 39 grams in size.  Does not void as well as he used to as his urine flow has decreased in recent past seemingly. (September 28, 2021)    ----------------------------------------------------------------------------------------------------------------------------------------------------------------------------------------------------------------------------------------------------------------     Follow up in 20 days (on 11/1/2021) for Pre op work up, CBC, BMP, COVID, EKG and KUB prior to visit.  Discussed cystoscopy findings with patient.  Recommended TURP and laser lithotripsy of bladder stones.  I think the grade 3 prostate cancer will still be managed conservatively and patient is agreeable with that plan.  He was given 3 days Cipro 500 mg b.i.d. to cover instrumentation.  Urine sent for culture.  (October 12, 2021)     Patient returned today for his outpatient workup in anticipation of having TURP and laser lithotripsy of large bladder stone Wednesday November 3rd.  He has not seen a lot of blood since he got over the cysto.  He has had no worsening bladder outlet obstructive symptoms.  Patient agreeable to proceeding with TURP and laser lithotripsy of bladder stone on Wednesday under general anesthesia.  (November 1, 2021)                 Plan:    patient agreeable to TURP and laser lithotripsy of large bladder stone on Wednesday November 3rd.  Urine sent for culture to make sure there is no infection.  Last culture was negative.  Instructions for admission to outpatient surgery Wednesday given by Ms. Lemus and understood by patient.  He understands he will have to spend at least 1 night in the hospital.*                Other Notes     All notes        Instructions     Patient agreeable to TURP and laser lithotripsy of large bladder stone on Wednesday November 3rd.  Urine sent for culture to make sure there is no infection.  Last culture was negative.  Instructions for admission to outpatient surgery Wednesday given by Ms. Lemus and understood by patient.  He understands he will have to spend at least 1 night in the hospital  He will take  morning medicine with a small amount of water prior to leaving home.     ------------------------------------------------------------------------------------------------------------------------------------------------------------------------------------------------------------------------------  Mr. Donita Song underwent TURP on November 3.  He has done very well.  There is no cancer in the path report that he still needs to be followed for well differentiated prostate cancer that was moved but needle biopsies previously.  He is pleased with the results of surgery.  Nocturia times 0 and has an excellent stream.  He has had no recent visible blood in the urine.  Now 5 weeks postop.  He has no new complaints.  (December 8, 2021)      Mr. Dillon is in for follow-up of prostate cancer.  He was originally found to have well differentiated prostate cancer by Dr. Martínez in 2020. He did not have any cancer in prostate chips when he had TURP November 3, 2021. He has continued to void very well.  Can sleep all night usually without having to get up to void.  No bleeding and no new  problems.  (June 8, 2022)     Mr. Dillon is in for his 6 month follow-up of well differentiated prostate cancer.  Previous biopsies had demonstrated [place]'s grade 3+3=6 adenocarcinoma but no prostate chips had cancer present when he had his TURP slightly over 1 year ago.  He continues to void well and can sleep all night without having to get up to void typically.  No new medical problems. [December 13, 2022]    Mr. Dillon is in for six-month follow-up of well differentiated prostate cancer.  PSA drawn and results pending.  Voiding okay post TURP.  No nocturia and he feels he is doing very well.  No new medical issues that he is aware of. [June 13, 2023]    Review of Systems       Objective     Physical Exam  Constitutional:       Appearance: Normal appearance. He is normal weight.   Genitourinary:     Prostate: Normal.      Rectum:  Normal.      Comments: Prostate gland is 25 g smooth and firm  Neurological:      Mental Status: He is alert.   Psychiatric:         Mood and Affect: Mood normal.         Behavior: Behavior normal.      Urinalysis revealed occasional pus cells and occasional red cells.  Dipstick had 2+ leukocytes, moderate blood, pH of 8.0 and urine specific gravity 1.005  Assessment and Plan     1. Malignant neoplasm of prostate  -     PSA, Total (Diagnostic); Future; Expected date: 12/13/2023    2. Bladder outlet obstruction    3. Chronic prostatitis    4. History of elevated PSA      Patient doing well clinically.  PSA was done and returned after he left the office.  It was 2.420 which is very similar to what it has been in recent past.  He was notified of results by telephone.  We will see in 6 months with another PSA or sooner for problems.          Follow up in 6 months (on 12/18/2023) for 6 month with PSA Dr Pina appointment at 9:30.

## 2023-09-01 ENCOUNTER — OFFICE VISIT (OUTPATIENT)
Dept: FAMILY MEDICINE | Facility: CLINIC | Age: 63
End: 2023-09-01
Payer: COMMERCIAL

## 2023-09-01 VITALS
HEART RATE: 68 BPM | HEIGHT: 67 IN | DIASTOLIC BLOOD PRESSURE: 82 MMHG | SYSTOLIC BLOOD PRESSURE: 122 MMHG | WEIGHT: 223.63 LBS | OXYGEN SATURATION: 97 % | RESPIRATION RATE: 16 BRPM | BODY MASS INDEX: 35.1 KG/M2

## 2023-09-01 DIAGNOSIS — Z13.220 LIPID SCREENING: ICD-10-CM

## 2023-09-01 DIAGNOSIS — I10 HYPERTENSION, ESSENTIAL: Primary | ICD-10-CM

## 2023-09-01 DIAGNOSIS — M10.9 GOUT, UNSPECIFIED CAUSE, UNSPECIFIED CHRONICITY, UNSPECIFIED SITE: ICD-10-CM

## 2023-09-01 LAB
ANION GAP SERPL CALCULATED.3IONS-SCNC: 10 MMOL/L (ref 7–16)
BUN SERPL-MCNC: 24 MG/DL (ref 7–18)
BUN/CREAT SERPL: 17 (ref 6–20)
CALCIUM SERPL-MCNC: 9.2 MG/DL (ref 8.5–10.1)
CHLORIDE SERPL-SCNC: 105 MMOL/L (ref 98–107)
CHOLEST SERPL-MCNC: 187 MG/DL (ref 0–200)
CHOLEST/HDLC SERPL: 4.9 {RATIO}
CO2 SERPL-SCNC: 30 MMOL/L (ref 21–32)
CREAT SERPL-MCNC: 1.42 MG/DL (ref 0.7–1.3)
EGFR (NO RACE VARIABLE) (RUSH/TITUS): 56 ML/MIN/1.73M2
GLUCOSE SERPL-MCNC: 96 MG/DL (ref 74–106)
HDLC SERPL-MCNC: 38 MG/DL (ref 40–60)
LDLC SERPL CALC-MCNC: 124 MG/DL
LDLC/HDLC SERPL: 3.3 {RATIO}
NONHDLC SERPL-MCNC: 149 MG/DL
POTASSIUM SERPL-SCNC: 4.1 MMOL/L (ref 3.5–5.1)
SODIUM SERPL-SCNC: 141 MMOL/L (ref 136–145)
TRIGL SERPL-MCNC: 123 MG/DL (ref 35–150)
VLDLC SERPL-MCNC: 25 MG/DL

## 2023-09-01 PROCEDURE — 80048 BASIC METABOLIC PANEL: ICD-10-PCS | Mod: ,,, | Performed by: CLINICAL MEDICAL LABORATORY

## 2023-09-01 PROCEDURE — 80061 LIPID PANEL: CPT | Mod: ,,, | Performed by: CLINICAL MEDICAL LABORATORY

## 2023-09-01 PROCEDURE — 99214 OFFICE O/P EST MOD 30 MIN: CPT | Mod: ,,, | Performed by: FAMILY MEDICINE

## 2023-09-01 PROCEDURE — 80048 BASIC METABOLIC PNL TOTAL CA: CPT | Mod: ,,, | Performed by: CLINICAL MEDICAL LABORATORY

## 2023-09-01 PROCEDURE — 99214 PR OFFICE/OUTPT VISIT, EST, LEVL IV, 30-39 MIN: ICD-10-PCS | Mod: ,,, | Performed by: FAMILY MEDICINE

## 2023-09-01 PROCEDURE — 80061 LIPID PANEL: ICD-10-PCS | Mod: ,,, | Performed by: CLINICAL MEDICAL LABORATORY

## 2023-09-01 RX ORDER — COLCHICINE 0.6 MG/1
TABLET ORAL
Qty: 10 TABLET | Refills: 5 | Status: SHIPPED | OUTPATIENT
Start: 2023-09-01

## 2023-09-01 RX ORDER — LISINOPRIL 40 MG/1
40 TABLET ORAL DAILY
Qty: 90 TABLET | Refills: 1 | Status: SHIPPED | OUTPATIENT
Start: 2023-09-01 | End: 2024-03-25 | Stop reason: SDUPTHER

## 2023-09-01 RX ORDER — HYDROCHLOROTHIAZIDE 12.5 MG/1
12.5 CAPSULE ORAL DAILY
Qty: 90 CAPSULE | Refills: 1 | Status: SHIPPED | OUTPATIENT
Start: 2023-09-01 | End: 2024-03-25 | Stop reason: SDUPTHER

## 2023-09-01 NOTE — PROGRESS NOTES
Subjective     Patient ID: Ernesto Dillon is a 63 y.o. male.    Chief Complaint: Medication Refill    In for follow-up on hypertension and gout.  Patient said he only has 1-2 gout flares per year.  Colchicine works well for him.  Blood pressures been under good control he is up-to-date on colonoscoped and PSA.    Medication Refill    Review of Systems       Objective     Physical Exam  Constitutional:       General: He is not in acute distress.     Appearance: He is not ill-appearing.   Cardiovascular:      Rate and Rhythm: Normal rate and regular rhythm.   Pulmonary:      Effort: Pulmonary effort is normal.      Breath sounds: Normal breath sounds.   Musculoskeletal:      Right lower leg: No edema.      Left lower leg: No edema.   Neurological:      General: No focal deficit present.      Mental Status: He is alert.   Psychiatric:         Mood and Affect: Mood normal.         Behavior: Behavior normal.         Thought Content: Thought content normal.          Assessment and Plan     1. Hypertension, essential  -     Basic Metabolic Panel; Future; Expected date: 09/01/2023  -     hydroCHLOROthiazide (MICROZIDE) 12.5 mg capsule; Take 1 capsule (12.5 mg total) by mouth once daily.  Dispense: 90 capsule; Refill: 1  -     lisinopriL (PRINIVIL,ZESTRIL) 40 MG tablet; Take 1 tablet (40 mg total) by mouth once daily.  Dispense: 90 tablet; Refill: 1    2. Lipid screening  -     Lipid Panel; Future; Expected date: 09/01/2023    3. Gout, unspecified cause, unspecified chronicity, unspecified site    Other orders  -     colchicine, gout, (COLCRYS) 0.6 mg tablet; Take 2 tablets by mouth then wait two hours, if still in pain take 1 tablet by mouth, no more tablets for 24 hours.  Dispense: 10 tablet; Refill: 5        BMP lipid panel pending         No follow-ups on file.

## 2023-12-18 ENCOUNTER — OFFICE VISIT (OUTPATIENT)
Dept: UROLOGY | Facility: CLINIC | Age: 63
End: 2023-12-18
Payer: COMMERCIAL

## 2023-12-18 VITALS
DIASTOLIC BLOOD PRESSURE: 85 MMHG | WEIGHT: 225 LBS | HEIGHT: 67 IN | SYSTOLIC BLOOD PRESSURE: 130 MMHG | BODY MASS INDEX: 35.31 KG/M2 | HEART RATE: 83 BPM

## 2023-12-18 DIAGNOSIS — Z80.42 FAMILY HISTORY OF PROSTATE CANCER: ICD-10-CM

## 2023-12-18 DIAGNOSIS — Z87.442 HISTORY OF NEPHROLITHIASIS: ICD-10-CM

## 2023-12-18 DIAGNOSIS — N41.1 CHRONIC PROSTATITIS: ICD-10-CM

## 2023-12-18 DIAGNOSIS — Z87.898 HISTORY OF ELEVATED PSA: ICD-10-CM

## 2023-12-18 DIAGNOSIS — I10 ESSENTIAL HYPERTENSION: ICD-10-CM

## 2023-12-18 DIAGNOSIS — C61 MALIGNANT NEOPLASM OF PROSTATE: Primary | ICD-10-CM

## 2023-12-18 PROCEDURE — 99214 OFFICE O/P EST MOD 30 MIN: CPT | Mod: PBBFAC | Performed by: UROLOGY

## 2023-12-18 PROCEDURE — 99213 PR OFFICE/OUTPT VISIT, EST, LEVL III, 20-29 MIN: ICD-10-PCS | Mod: S$PBB,,, | Performed by: UROLOGY

## 2023-12-18 PROCEDURE — 99213 OFFICE O/P EST LOW 20 MIN: CPT | Mod: S$PBB,,, | Performed by: UROLOGY

## 2023-12-18 NOTE — PATIENT INSTRUCTIONS
Patient doing okay clinically.  He was notified of PSA of 2.550 by telephone after he left the office.  Six-month appointment with PSA.      Tuesday, June 18, 2024 at 9:00 AM for 6 month PSA and Office Visit with Dr. Yovani Jr.

## 2023-12-18 NOTE — PROGRESS NOTES
Subjective     Patient ID: Amrita Banerjee is a 63 y.o. male.    Chief Complaint: Follow-up (6 month visit, PSA C61)         PATIENT: AMRITA BANERJEE               CASE NUMBER:     [id number]        1960 AGE: 60 yrs SEX: M          ACCOUNT NUMBER:   [id number]     RACE:    Black                                  UNIT NUMBER:     [id number]     ATTENDING                                       DATE COLLECTED:   2020              Jackson Martínez M.D.     PHYSICIAN:                                                     DATE RECEIVED: 2020                                                     DATE REPORTED: 2020       DIAGNOSIS:   A.  Left lateral prostate #1, biopsies:          - Focal high grade prostatic intraepithelial neoplasia         - Mild chronic prostatitis          - See comment   B.  Left lateral prostate #2, biopsies:          - Focal high grade prostatic intraepithelial neoplasia         - Mild chronic prostatitis          - See comment   C.  Right lateral prostate #3, biopsies:          - [place]'s grade 3+3=6 adenocarcinoma of the prostate involving         two of the seven biopsy            fragments (approximately 3% of the specimen)         - High grade prostatic intraepithelial neoplasia         - Mild chronic prostatitis          - See comment   D.  Right lateral prostate #4, biopsies:          - Focal high grade prostatic intraepithelial neoplasia         - See comment      COMMENTS:       Properly controlled immunohistochemical cocktails for                     PIN 4 are performed on all four of the specimens. There                    is a lack of basal staining along with positive AMACR                     staining within the invasive foci of specimen C. This                     helps to confirm the diagnosis. There is also some                     AMACR staining within the areas of high grade                     prostatic intraepithelial neoplasia.                "         Dr. Koehler has reviewed a selected portion of this   case,                     and he agrees with the findings.      SPECIMEN SUBMITTED:   A.  Bx's left lateral prostate #1   B.  Bx's left lateral prostate #2   C.  Bx's right lateral prostate #3   D.  Bx's right lateral prostate #4      GROSS DESCRIPTION:   A. Specimen A is received in formalin designated "bx's left lateral        prostate #1" and consists of three tan stringy biopsy fragments        ranging in length from 0.9 to 1.5 cm.  Additionally there are two        hemorrhagic tan filamentous fragments measuring 1.0 and 1.4 cm        respectively.  All submitted in block A1.   B. Specimen B is received in formalin designated "bx's left lateral        prostate #2" and consists of three tan stringy biopsy fragments        ranging in length from 1.4 to 1.5 cm.  Additionally there are two        hemorrhagic tan filamentous fragments measuring 0.3 and 0.5 cm        respectively.  All submitted in block B1.   C. Specimen C is received in formalin designated "bx's right lateral        prostate #3" and consists of three tan stringy biopsy fragments        ranging in length from 1.1 to 2.3 cm in which the longest is        divided.  Additionally there is a tan hemorrhagic filamentous        fragment measuring 0.7 cm in length.  All submitted in block C1.   D. Specimen D is received in formalin designated "bx's right lateral        prostate #4" and consists of three tan stringy biopsy fragments        ranging in length from 0.7 to 1.0 cm.  Additionally there are five        hemorrhagic pink to tan filamentous fragments ranging from 0.1 to        0.5 cm.  All submitted in block D1.  CAC/stg      MICROSCOPIC DESCRIPTION:   A microscopic examination has been performed.      Clinical History: R97.20, PSA 4.87                                                    Electronically Signed By:                                                                     Martin GIL" Jr. Chun MD                                                                  05/14/2020 11:21      Result comment: The above 1 analytes were performed by [facility]   [address] [place] [address],[place] [id number]      ----------------------------------------------------------------------------------------------------------------------------------------------------------------------------------------------------------------------------------------------------------------------------------------------------------------------  History of Present Illness  General  Patient here for 6 month f/u with PSA prior. F/u low grade low volume prostate cancer on active survellaince by monitoring the numbers. Recent PSA by primary. PSA 4.9 a little higher. His number fluctuates, but has not gone up much at all. Still taking Flomax-no changes with urination.      Assessment:  CAP        Plan:  3 months with PSA prior   Continue Flomax 0.4 mg #90 with 3 refills  *medications reviewed-will continue Flomax.      Last visit below:  Patient presents today for 2 month f/u with PSA prior. His numbers look good. Diagnosed  with low risk prostate cancer a few months ago with active survellaince by monitoring the numbers. Has weak stream, but about the same. Taking Flomax to help him void better. Reports he is taking otc supplement once weekly for prostate-discussed.   Patient would like to wait 6 months for f/u.      Assessment:  CAP  BPH  Hx of elevated PSA     Plan:  PSA in 6 months with PSA prior   Continue Flomax 0.4 mg daily-new rx #90 with 11 refills.      Last clinic note 06/03/2020  60-year-old male here today for discussion further on recent diagnosis of very low risk prostate cancer [place] 3+3 and one at 12 cores with a PSA of less than 10. He is interested in focal ablation.     Assessment  Very low Risk prostate cancer  Luts     Plan  We again discussed all the treatment modalities in detail. Also offer him genetic testing  such as for MDX. We again reviewed asked surveillance  Explained how total gland ablation is performed  At this time we'll just repeat a PSA in 2 months. We will trend and based on this will get a prostate MRI at one year of diagnosis he changes his mind on treatment program  -------------------------------------------------------------------------------------------------------------------------------------------------------------------------------------------------------------------------------------------------------------------------------------  Above note of Feb. 8, 2021 by Dr. YEN Martínez     Ref Range    0.000-4.100  ng/mL   Feb 5 2021 15:16 C 4.950 H    Aug 3 2020 09:49 C 3.850   May 4 2020 09:40 C 4.870 H    Jan 7 2020 09:17 C 3.920 H    Mar 29 2019 13:06 C 3.640 H    Feb 2 2018 10:57 C 2.500 Maar 20 2017 08:30 C 2.800   Jan 9 2015 14:07 C 1.520   ---------------------------------------------------------------------------------------------     PSA was 4.060 on May 10, 2021  PSA was 5.200 on September 28, 2021  PSA was 2.390 on June 8, 2022  PSA was 2.520 on December 13, 2022  PSA was 2.420 on June 13, 2023  PSA was 2.550 on December 18, 2023    Mr. Dillon is former patient of Dr. Jackson Martínez who found him to have well differentiated prostate cancer and the patient has been managed conservatively for that.  Patient was treated by me for stone several years ago.  His wife is Elba Ellington and his sister is Ernesto Almanzar who were former employees.  Patient also has blood in the urine and low back pain.  He has passed a few clots in his urine in recent past.  He has had 2 or 3 stones in the past but does not think he has had any since around 2005. The back pain that he has is primarily in the left lower back.  Urine flow has been somewhat slower recently also.  The prostate was reported as being 39 grams in size.  Does not void as well as he used to as his urine flow has decreased in recent past seemingly.  (September 28, 2021)   ----------------------------------------------------------------------------------------------------------------------------------------------------------------------------------------------------------------------------------------------------------------     Follow up in 20 days (on 11/1/2021) for Pre op work up, CBC, BMP, COVID, EKG and KUB prior to visit.  Discussed cystoscopy findings with patient.  Recommended TURP and laser lithotripsy of bladder stones.  I think the grade 3 prostate cancer will still be managed conservatively and patient is agreeable with that plan.  He was given 3 days Cipro 500 mg b.i.d. to cover instrumentation.  Urine sent for culture.  (October 12, 2021)     Patient returned today for his outpatient workup in anticipation of having TURP and laser lithotripsy of large bladder stone Wednesday November 3rd.  He has not seen a lot of blood since he got over the cysto.  He has had no worsening bladder outlet obstructive symptoms.  Patient agreeable to proceeding with TURP and laser lithotripsy of bladder stone on Wednesday under general anesthesia.  (November 1, 2021)  Mr. Donita Song underwent TURP on November 3.  He has done very well.  There is no cancer in the path report that he still needs to be followed for well differentiated prostate cancer that was moved but needle biopsies previously.  He is pleased with the results of surgery.  Nocturia times 0 and has an excellent stream.  He has had no recent visible blood in the urine.  Now 5 weeks postop.  He has no new complaints.  (December 8, 2021)      Mr. Dillon is in for follow-up of prostate cancer.  He was originally found to have well differentiated prostate cancer by Dr. Martínez in 2020. He did not have any cancer in prostate chips when he had TURP November 3, 2021. He has continued to void very well.  Can sleep all night usually without having to get up to void.  No bleeding and no new  problems.  (June  8, 2022)    Mr. Dillon is in for six-month follow-up of well differentiated prostate cancer.  PSA drawn and results pending.  Voiding okay post TURP.  No nocturia and he feels he is doing very well.  No new medical issues that he is aware of. [June 13, 2023]    Mr. Dillon is in for follow-up of his well differentiated prostate cancer.  Overall satisfactory 6 months with no new problems.  He had his PSA done today and we will notify him of results.  He feels he is okay with his medical problems.  [December 18, 2023]          Mr. Dillon is in for his 6 month follow-up of well differentiated prostate cancer.  Previous biopsies had demonstrated Koh[place]'s grade 3+3=6 adenocarcinoma but no prostate chips had cancer present when he had his TURP slightly over 1 year ago.  He continues to void well and can sleep all night without having to get up to void typically.  No new medical problems. [December 13, 2022]                  Follow-up      Review of Systems       Objective     Physical Exam  Constitutional:       Appearance: Normal appearance. He is normal weight.   Genitourinary:     Prostate: Normal.      Rectum: Normal.      Comments: Prostate 30 g smooth symmetrical and firm.  Neurological:      Mental Status: He is alert.   Psychiatric:         Mood and Affect: Mood normal.         Behavior: Behavior normal.     Urinalysis unremarkable with only occasional pus cells and occasional epithelial cells.  Dipstick had a trace of blood, trace of protein, pH 5.0 and specific gravity 1.020     Assessment and Plan   1. Malignant neoplasm of prostate  -     PSA, Total (Diagnostic); Future; Expected date: 06/18/2024    2. Chronic prostatitis    3. History of elevated PSA    4. Family history of prostate cancer    5. History of nephrolithiasis    6. Essential hypertension      Patient doing okay clinically.  He was notified of PSA of 2.550 by telephone after he left the office.  Six-month appointment with PSA.           Follow up in 6 months (on 6/18/2024).

## 2024-03-25 ENCOUNTER — OFFICE VISIT (OUTPATIENT)
Dept: PRIMARY CARE CLINIC | Facility: CLINIC | Age: 64
End: 2024-03-25
Payer: COMMERCIAL

## 2024-03-25 VITALS
WEIGHT: 223 LBS | SYSTOLIC BLOOD PRESSURE: 132 MMHG | BODY MASS INDEX: 35 KG/M2 | TEMPERATURE: 98 F | HEIGHT: 67 IN | HEART RATE: 78 BPM | OXYGEN SATURATION: 98 % | DIASTOLIC BLOOD PRESSURE: 84 MMHG

## 2024-03-25 DIAGNOSIS — Z12.11 SCREENING FOR MALIGNANT NEOPLASM OF COLON: ICD-10-CM

## 2024-03-25 DIAGNOSIS — I10 HYPERTENSION, ESSENTIAL: ICD-10-CM

## 2024-03-25 DIAGNOSIS — Z11.59 NEED FOR HEPATITIS C SCREENING TEST: ICD-10-CM

## 2024-03-25 DIAGNOSIS — Z11.4 SCREENING FOR HIV (HUMAN IMMUNODEFICIENCY VIRUS): ICD-10-CM

## 2024-03-25 DIAGNOSIS — Z00.00 ROUTINE GENERAL MEDICAL EXAMINATION AT A HEALTH CARE FACILITY: Primary | ICD-10-CM

## 2024-03-25 DIAGNOSIS — Z13.1 SCREENING FOR DIABETES MELLITUS: ICD-10-CM

## 2024-03-25 PROBLEM — R21 RASH AND NONSPECIFIC SKIN ERUPTION: Status: RESOLVED | Noted: 2023-03-03 | Resolved: 2024-03-25

## 2024-03-25 PROCEDURE — 99396 PREV VISIT EST AGE 40-64: CPT | Mod: ,,, | Performed by: NURSE PRACTITIONER

## 2024-03-25 RX ORDER — HYDROCHLOROTHIAZIDE 12.5 MG/1
12.5 CAPSULE ORAL DAILY
Qty: 90 CAPSULE | Refills: 3 | Status: SHIPPED | OUTPATIENT
Start: 2024-03-25

## 2024-03-25 RX ORDER — LISINOPRIL 40 MG/1
40 TABLET ORAL DAILY
Qty: 90 TABLET | Refills: 3 | Status: SHIPPED | OUTPATIENT
Start: 2024-03-25

## 2024-03-25 NOTE — PROGRESS NOTES
Subjective     Patient ID: Ernesto Dillon is a 64 y.o. male.    Chief Complaint: Establish Care (Med refills) and Annual Exam    Pt presents for a wellness visit and to establish care.       Review of Systems   Constitutional:  Negative for activity change, appetite change, fatigue and fever.   HENT:  Negative for nasal congestion, nosebleeds, postnasal drip, rhinorrhea, sinus pressure/congestion, sneezing and sore throat.    Eyes:  Negative for pain and itching.   Respiratory:  Negative for cough, chest tightness, shortness of breath, wheezing and stridor.    Cardiovascular:  Negative for chest pain.   Gastrointestinal:  Negative for abdominal pain.   Genitourinary:  Negative for dysuria.   Musculoskeletal:  Negative for back pain.   Neurological:  Negative for dizziness and headaches.   Psychiatric/Behavioral:  Negative for behavioral problems and confusion.           Objective     Physical Exam  Vitals and nursing note reviewed.   HENT:      Head: Normocephalic.   Eyes:      Pupils: Pupils are equal, round, and reactive to light.   Cardiovascular:      Rate and Rhythm: Normal rate and regular rhythm.      Heart sounds: Normal heart sounds.   Pulmonary:      Breath sounds: Normal breath sounds.   Abdominal:      General: Bowel sounds are normal.   Musculoskeletal:         General: Normal range of motion.      Cervical back: Normal range of motion and neck supple.   Skin:     General: Skin is warm and dry.   Neurological:      General: No focal deficit present.      Mental Status: He is alert.   Psychiatric:         Mood and Affect: Mood normal.            Assessment and Plan     1. Routine general medical examination at a health care facility    2. Hypertension, essential  -     lisinopriL (PRINIVIL,ZESTRIL) 40 MG tablet; Take 1 tablet (40 mg total) by mouth once daily.  Dispense: 90 tablet; Refill: 3  -     hydroCHLOROthiazide (MICROZIDE) 12.5 mg capsule; Take 1 capsule (12.5 mg total) by mouth once  daily.  Dispense: 90 capsule; Refill: 3  -     CBC Auto Differential; Future; Expected date: 03/25/2024  -     Comprehensive Metabolic Panel; Future; Expected date: 03/25/2024  -     Lipid Panel; Future; Expected date: 03/25/2024  -     TSH; Future; Expected date: 03/25/2024    3. BMI 34.0-34.9,adult    4. Need for hepatitis C screening test  -     Hepatitis C Antibody; Future; Expected date: 03/25/2024    5. Screening for HIV (human immunodeficiency virus)  -     HIV 1/2 Ag/Ab (4th Gen); Future; Expected date: 03/25/2024    6. Screening for diabetes mellitus  -     Hemoglobin A1C; Future; Expected date: 03/25/2024    7. Screening for malignant neoplasm of colon  -     Colonoscopy; Future; Expected date: 03/25/2024        Will call pt with lab results. Entered an order for a colonoscopy with Dr. Soria.          Follow up in about 6 months (around 9/25/2024).

## 2024-03-26 ENCOUNTER — PATIENT MESSAGE (OUTPATIENT)
Dept: PRIMARY CARE CLINIC | Facility: CLINIC | Age: 64
End: 2024-03-26
Payer: COMMERCIAL

## 2024-06-18 ENCOUNTER — OFFICE VISIT (OUTPATIENT)
Dept: UROLOGY | Facility: CLINIC | Age: 64
End: 2024-06-18
Payer: COMMERCIAL

## 2024-06-18 VITALS
WEIGHT: 227 LBS | BODY MASS INDEX: 35.63 KG/M2 | HEART RATE: 80 BPM | SYSTOLIC BLOOD PRESSURE: 148 MMHG | DIASTOLIC BLOOD PRESSURE: 85 MMHG | HEIGHT: 67 IN

## 2024-06-18 DIAGNOSIS — I10 ESSENTIAL HYPERTENSION: ICD-10-CM

## 2024-06-18 DIAGNOSIS — Z87.898 HISTORY OF ELEVATED PSA: ICD-10-CM

## 2024-06-18 DIAGNOSIS — N41.1 CHRONIC PROSTATITIS: ICD-10-CM

## 2024-06-18 DIAGNOSIS — Z87.442 HISTORY OF NEPHROLITHIASIS: ICD-10-CM

## 2024-06-18 DIAGNOSIS — C61 MALIGNANT NEOPLASM OF PROSTATE: Primary | ICD-10-CM

## 2024-06-18 DIAGNOSIS — Z80.42 FAMILY HISTORY OF PROSTATE CANCER: ICD-10-CM

## 2024-06-18 PROCEDURE — 99213 OFFICE O/P EST LOW 20 MIN: CPT | Mod: S$PBB,,, | Performed by: UROLOGY

## 2024-06-18 PROCEDURE — 99999 PR PBB SHADOW E&M-EST. PATIENT-LVL IV: CPT | Mod: PBBFAC,,, | Performed by: UROLOGY

## 2024-06-18 PROCEDURE — 99214 OFFICE O/P EST MOD 30 MIN: CPT | Mod: PBBFAC | Performed by: UROLOGY

## 2024-06-18 NOTE — PATIENT INSTRUCTIONS
Patient continues to do well clinically.  PSA not available while patient was in the clinic but subsequently returned as 2.810.  Patient notified of results by telephone.  Recommend continued conservative treatment.  He understands that I plan to retire this year..  Appointment in about 6 months with Mr. Jayesh Wilkins with PSA.

## 2024-06-18 NOTE — PROGRESS NOTES
Subjective     Patient ID: Amrita Banerjee is a 64 y.o. male.    Chief Complaint: Prostate Cancer (6 month PSA c61 and office visit)         PATIENT: AMRITA BANERJEE               CASE NUMBER:     [id number]        1960 AGE: 60 yrs SEX: M          ACCOUNT NUMBER:   [id number]     RACE:    Black                                  UNIT NUMBER:     [id number]     ATTENDING                                       DATE COLLECTED:   2020              Jackson Martínez M.D.     PHYSICIAN:                                                     DATE RECEIVED: 2020                                                     DATE REPORTED: 2020       DIAGNOSIS:   A.  Left lateral prostate #1, biopsies:          - Focal high grade prostatic intraepithelial neoplasia         - Mild chronic prostatitis          - See comment   B.  Left lateral prostate #2, biopsies:          - Focal high grade prostatic intraepithelial neoplasia         - Mild chronic prostatitis          - See comment   C.  Right lateral prostate #3, biopsies:          - Elvie's grade 3+3=6 adenocarcinoma of the prostate involving         two of the seven biopsy            fragments (approximately 3% of the specimen)         - High grade prostatic intraepithelial neoplasia         - Mild chronic prostatitis          - See comment   D.  Right lateral prostate #4, biopsies:          - Focal high grade prostatic intraepithelial neoplasia         - See comment      COMMENTS:       Properly controlled immunohistochemical cocktails for                     PIN 4 are performed on all four of the specimens. There                    is a lack of basal staining along with positive AMACR                     staining within the invasive foci of specimen C. This                     helps to confirm the diagnosis. There is also some                     AMACR staining within the areas of high grade                     prostatic intraepithelial  "neoplasia.                        Dr. Koehler has reviewed a selected portion of this   case,                     and he agrees with the findings.      SPECIMEN SUBMITTED:   A.  Bx's left lateral prostate #1   B.  Bx's left lateral prostate #2   C.  Bx's right lateral prostate #3   D.  Bx's right lateral prostate #4      GROSS DESCRIPTION:   A. Specimen A is received in formalin designated "bx's left lateral        prostate #1" and consists of three tan stringy biopsy fragments        ranging in length from 0.9 to 1.5 cm.  Additionally there are two        hemorrhagic tan filamentous fragments measuring 1.0 and 1.4 cm        respectively.  All submitted in block A1.   B. Specimen B is received in formalin designated "bx's left lateral        prostate #2" and consists of three tan stringy biopsy fragments        ranging in length from 1.4 to 1.5 cm.  Additionally there are two        hemorrhagic tan filamentous fragments measuring 0.3 and 0.5 cm        respectively.  All submitted in block B1.   C. Specimen C is received in formalin designated "bx's right lateral        prostate #3" and consists of three tan stringy biopsy fragments        ranging in length from 1.1 to 2.3 cm in which the longest is        divided.  Additionally there is a tan hemorrhagic filamentous        fragment measuring 0.7 cm in length.  All submitted in block C1.   D. Specimen D is received in formalin designated "bx's right lateral        prostate #4" and consists of three tan stringy biopsy fragments        ranging in length from 0.7 to 1.0 cm.  Additionally there are five        hemorrhagic pink to tan filamentous fragments ranging from 0.1 to        0.5 cm.  All submitted in block D1.  CAC/stg      MICROSCOPIC DESCRIPTION:   A microscopic examination has been performed.      Clinical History: R97.20, PSA 4.87                                                    Electronically Signed By:                                                       "               Martin Chun Jr., MD                                                                  05/14/2020 11:21      Result comment: The above 1 analytes were performed by [facility]   [address] [place] [address],[place] [id number]      ----------------------------------------------------------------------------------------------------------------------------------------------------------------------------------------------------------------------------------------------------------------------------------------------------------------------  History of Present Illness  General  Patient here for 6 month f/u with PSA prior. F/u low grade low volume prostate cancer on active survellaince by monitoring the numbers. Recent PSA by primary. PSA 4.9 a little higher. His number fluctuates, but has not gone up much at all. Still taking Flomax-no changes with urination.      Assessment:  CAP        Plan:  3 months with PSA prior   Continue Flomax 0.4 mg #90 with 3 refills  *medications reviewed-will continue Flomax.      Last visit below:  Patient presents today for 2 month f/u with PSA prior. His numbers look good. Diagnosed  with low risk prostate cancer a few months ago with active survellaince by monitoring the numbers. Has weak stream, but about the same. Taking Flomax to help him void better. Reports he is taking otc supplement once weekly for prostate-discussed.   Patient would like to wait 6 months for f/u.      Assessment:  CAP  BPH  Hx of elevated PSA     Plan:  PSA in 6 months with PSA prior   Continue Flomax 0.4 mg daily-new rx #90 with 11 refills.      Last clinic note 06/03/2020  60-year-old male here today for discussion further on recent diagnosis of very low risk prostate cancer Elvie 3+3 and one at 12 cores with a PSA of less than 10. He is interested in focal ablation.     Assessment  Very low Risk prostate cancer  Luts     Plan  We again discussed all the treatment modalities in detail. Also offer  him genetic testing such as for MDX. We again reviewed asked surveillance  Explained how total gland ablation is performed  At this time we'll just repeat a PSA in 2 months. We will trend and based on this will get a prostate MRI at one year of diagnosis he changes his mind on treatment program  -------------------------------------------------------------------------------------------------------------------------------------------------------------------------------------------------------------------------------------------------------------------------------------  Above note of Feb. 8, 2021 by Dr. YEN Martínez     Ref Range    0.000-4.100  ng/mL   Feb 5 2021 15:16 C 4.950 H    Aug 3 2020 09:49 C 3.850   May 4 2020 09:40 C 4.870 H    Jan 7 2020 09:17 C 3.920 H    Mar 29 2019 13:06 C 3.640 H    Feb 2 2018 10:57 C 2.500 Maar 20 2017 08:30 C 2.800   Jan 9 2015 14:07 C 1.520   ---------------------------------------------------------------------------------------------     PSA was 4.060 on May 10, 2021  PSA was 5.200 on September 28, 2021  PSA was 2.390 on June 8, 2022  PSA was 2.520 on December 13, 2022  PSA was 2.420 on June 13, 2023  PSA was 2.550 on December 18, 2023  PSA was 2.810 on June 18, 2024     Mr. Dillon is former patient of Dr. Jackson Martínez who found him to have well differentiated prostate cancer and the patient has been managed conservatively for that.  Patient was treated by me for stone several years ago.  His wife is Elba Ellington and his sister is Ernesto Almanzar who were former employees.  Patient also has blood in the urine and low back pain.  He has passed a few clots in his urine in recent past.  He has had 2 or 3 stones in the past but does not think he has had any since around 2005. The back pain that he has is primarily in the left lower back.  Urine flow has been somewhat slower recently also.  The prostate was reported as being 39 grams in size.  Does not void as well as he used to as  his urine flow has decreased in recent past seemingly. (September 28, 2021)   ----------------------------------------------------------------------------------------------------------------------------------------------------------------------------------------------------------------------------------------------------------------     Follow up in 20 days (on 11/1/2021) for Pre op work up, CBC, BMP, COVID, EKG and KUB prior to visit.  Discussed cystoscopy findings with patient.  Recommended TURP and laser lithotripsy of bladder stones.  I think the grade 3 prostate cancer will still be managed conservatively and patient is agreeable with that plan.  He was given 3 days Cipro 500 mg b.i.d. to cover instrumentation.  Urine sent for culture.  (October 12, 2021)     Patient returned today for his outpatient workup in anticipation of having TURP and laser lithotripsy of large bladder stone Wednesday November 3rd.  He has not seen a lot of blood since he got over the cysto.  He has had no worsening bladder outlet obstructive symptoms.  Patient agreeable to proceeding with TURP and laser lithotripsy of bladder stone on Wednesday under general anesthesia.  (November 1, 2021)    Mr. Donita Song underwent TURP on November 3.  He has done very well.  There is no cancer in the path report that he still needs to be followed for well differentiated prostate cancer that was moved but needle biopsies previously.  He is pleased with the results of surgery.  Nocturia times 0 and has an excellent stream.  He has had no recent visible blood in the urine.  Now 5 weeks postop.  He has no new complaints.  (December 8, 2021)      Mr. Dillon is in for follow-up of prostate cancer.  He was originally found to have well differentiated prostate cancer by Dr. Martínez in 2020. He did not have any cancer in prostate chips when he had TURP November 3, 2021. He has continued to void very well.  Can sleep all night usually without having to get  up to void.  No bleeding and no new  problems.  (June 8, 2022)    Mr. Dillon is in for his 6 month follow-up of well differentiated prostate cancer.  Previous biopsies had demonstrated Eastlake's grade 3+3=6 adenocarcinoma but no prostate chips had cancer present when he had his TURP slightly over 1 year ago.  He continues to void well and can sleep all night without having to get up to void typically.  No new medical problems. [December 13, 2022]     Mr. Dillon is in for six-month follow-up of well differentiated prostate cancer.  PSA drawn and results pending.  Voiding okay post TURP.  No nocturia and he feels he is doing very well.  No new medical issues that he is aware of. [June 13, 2023]     Mr. Dillon is in for follow-up of his well differentiated prostate cancer.  Overall satisfactory 6 months with no new problems.  He had his PSA done today and we will notify him of results.  He feels he is okay with his medical problems.  [December 18, 2023]     Mr. Dillon continues to void satisfactorily.  In for follow-up of his well differentiated prostate cancer that was found prior to the TURP, but had no additional malignancy found in the prostate chips.  He continues doing well clinically.  No new health issues. [June 18, 2024]            Review of Systems       Objective     Physical Exam  Constitutional:       Appearance: Normal appearance. He is normal weight.   Genitourinary:     Prostate: Normal.      Rectum: Normal.      Comments: Prostate 30 g slightly nodular and firm  Neurological:      Mental Status: He is alert.   Psychiatric:         Mood and Affect: Mood normal.         Behavior: Behavior normal.     Urinalysis had only occasional pus cells and rare red cells.  The dipstick had a trace of blood with pH 6.0 and specific gravity 1.025     Assessment and Plan     1. Malignant neoplasm of prostate  -     PSA, Total (Diagnostic); Future; Expected date: 12/18/2024    2. Chronic  prostatitis    3. History of elevated PSA    4. Family history of prostate cancer    5. History of nephrolithiasis    6. Essential hypertension        Patient continues to do well clinically.  PSA not available while patient was in the clinic but subsequently returned as 2.810.  Patient notified of results by telephone.  Recommend continued conservative treatment.  He understands that I plan to retire this year..  Appointment in about 6 months with Mr. Jayesh Wilkins with PSA.        No follow-ups on file.

## 2024-09-25 ENCOUNTER — PATIENT MESSAGE (OUTPATIENT)
Dept: FAMILY MEDICINE | Facility: CLINIC | Age: 64
End: 2024-09-25
Payer: COMMERCIAL

## 2024-09-25 ENCOUNTER — OFFICE VISIT (OUTPATIENT)
Dept: FAMILY MEDICINE | Facility: CLINIC | Age: 64
End: 2024-09-25
Payer: COMMERCIAL

## 2024-09-25 VITALS
HEIGHT: 67 IN | OXYGEN SATURATION: 98 % | WEIGHT: 226 LBS | BODY MASS INDEX: 35.47 KG/M2 | HEART RATE: 78 BPM | DIASTOLIC BLOOD PRESSURE: 85 MMHG | SYSTOLIC BLOOD PRESSURE: 137 MMHG | TEMPERATURE: 98 F

## 2024-09-25 DIAGNOSIS — Z12.11 SCREENING FOR MALIGNANT NEOPLASM OF COLON: ICD-10-CM

## 2024-09-25 DIAGNOSIS — I10 HYPERTENSION, ESSENTIAL: Primary | ICD-10-CM

## 2024-09-25 LAB
ALBUMIN SERPL BCP-MCNC: 3.7 G/DL (ref 3.5–5)
ALBUMIN/GLOB SERPL: 0.9 {RATIO}
ALP SERPL-CCNC: 80 U/L (ref 45–115)
ALT SERPL W P-5'-P-CCNC: 24 U/L (ref 16–61)
ANION GAP SERPL CALCULATED.3IONS-SCNC: 8 MMOL/L (ref 7–16)
AST SERPL W P-5'-P-CCNC: 23 U/L (ref 15–37)
BASOPHILS # BLD AUTO: 0.03 K/UL (ref 0–0.2)
BASOPHILS NFR BLD AUTO: 0.5 % (ref 0–1)
BILIRUB SERPL-MCNC: 0.3 MG/DL (ref ?–1.2)
BUN SERPL-MCNC: 24 MG/DL (ref 7–18)
BUN/CREAT SERPL: 16 (ref 6–20)
CALCIUM SERPL-MCNC: 8.7 MG/DL (ref 8.5–10.1)
CHLORIDE SERPL-SCNC: 104 MMOL/L (ref 98–107)
CHOLEST SERPL-MCNC: 179 MG/DL (ref 0–200)
CHOLEST/HDLC SERPL: 4.7 {RATIO}
CO2 SERPL-SCNC: 30 MMOL/L (ref 21–32)
CREAT SERPL-MCNC: 1.53 MG/DL (ref 0.7–1.3)
DIFFERENTIAL METHOD BLD: ABNORMAL
EGFR (NO RACE VARIABLE) (RUSH/TITUS): 50 ML/MIN/1.73M2
EOSINOPHIL # BLD AUTO: 0.24 K/UL (ref 0–0.5)
EOSINOPHIL NFR BLD AUTO: 4.1 % (ref 1–4)
ERYTHROCYTE [DISTWIDTH] IN BLOOD BY AUTOMATED COUNT: 14 % (ref 11.5–14.5)
GLOBULIN SER-MCNC: 4.1 G/DL (ref 2–4)
GLUCOSE SERPL-MCNC: 91 MG/DL (ref 74–106)
HCT VFR BLD AUTO: 43.1 % (ref 40–54)
HDLC SERPL-MCNC: 38 MG/DL (ref 40–60)
HGB BLD-MCNC: 13.8 G/DL (ref 13.5–18)
IMM GRANULOCYTES # BLD AUTO: 0.02 K/UL (ref 0–0.04)
IMM GRANULOCYTES NFR BLD: 0.3 % (ref 0–0.4)
LDLC SERPL CALC-MCNC: 115 MG/DL
LDLC/HDLC SERPL: 3 {RATIO}
LYMPHOCYTES # BLD AUTO: 1.88 K/UL (ref 1–4.8)
LYMPHOCYTES NFR BLD AUTO: 32 % (ref 27–41)
MCH RBC QN AUTO: 29.2 PG (ref 27–31)
MCHC RBC AUTO-ENTMCNC: 32 G/DL (ref 32–36)
MCV RBC AUTO: 91.3 FL (ref 80–96)
MONOCYTES # BLD AUTO: 0.76 K/UL (ref 0–0.8)
MONOCYTES NFR BLD AUTO: 12.9 % (ref 2–6)
MPC BLD CALC-MCNC: 12 FL (ref 9.4–12.4)
NEUTROPHILS # BLD AUTO: 2.94 K/UL (ref 1.8–7.7)
NEUTROPHILS NFR BLD AUTO: 50.2 % (ref 53–65)
NONHDLC SERPL-MCNC: 141 MG/DL
NRBC # BLD AUTO: 0 X10E3/UL
NRBC, AUTO (.00): 0 %
PLATELET # BLD AUTO: 213 K/UL (ref 150–400)
POTASSIUM SERPL-SCNC: 4 MMOL/L (ref 3.5–5.1)
PROT SERPL-MCNC: 7.8 G/DL (ref 6.4–8.2)
RBC # BLD AUTO: 4.72 M/UL (ref 4.6–6.2)
SODIUM SERPL-SCNC: 138 MMOL/L (ref 136–145)
TRIGL SERPL-MCNC: 128 MG/DL (ref 35–150)
TSH SERPL DL<=0.005 MIU/L-ACNC: 2.57 UIU/ML (ref 0.36–3.74)
VLDLC SERPL-MCNC: 26 MG/DL
WBC # BLD AUTO: 5.87 K/UL (ref 4.5–11)

## 2024-09-25 PROCEDURE — 84443 ASSAY THYROID STIM HORMONE: CPT | Mod: ,,, | Performed by: CLINICAL MEDICAL LABORATORY

## 2024-09-25 PROCEDURE — 80061 LIPID PANEL: CPT | Mod: ,,, | Performed by: CLINICAL MEDICAL LABORATORY

## 2024-09-25 PROCEDURE — 80053 COMPREHEN METABOLIC PANEL: CPT | Mod: ,,, | Performed by: CLINICAL MEDICAL LABORATORY

## 2024-09-25 PROCEDURE — 99214 OFFICE O/P EST MOD 30 MIN: CPT | Mod: ,,, | Performed by: NURSE PRACTITIONER

## 2024-09-25 PROCEDURE — 85025 COMPLETE CBC W/AUTO DIFF WBC: CPT | Mod: ,,, | Performed by: CLINICAL MEDICAL LABORATORY

## 2024-09-25 RX ORDER — GABAPENTIN 300 MG/1
300 CAPSULE ORAL 3 TIMES DAILY PRN
Qty: 90 CAPSULE | Refills: 1 | Status: SHIPPED | OUTPATIENT
Start: 2024-09-25

## 2024-09-25 RX ORDER — COLCHICINE 0.6 MG/1
TABLET ORAL
Qty: 10 TABLET | Refills: 5 | Status: SHIPPED | OUTPATIENT
Start: 2024-09-25

## 2025-01-10 NOTE — PROGRESS NOTES
Subjective     Patient ID: Ernesto Dillon is a 64 y.o. male.    Chief Complaint:  Follow-up of prostate cancer    This pleasant 64-year-old male presents to the clinic for follow-up of prostate cancer.  Patient states he is doing good today.  He has a previous patient of Dr. Charles Pina for prostate cancer.  Patient was diagnosed with prostate cancer by prostate biopsies in May of 2020 by Dr. Jackson Martínez.  The pathology report dated May 14th, 2020 showed a Culver's grade 3+3=6 adenocarcinoma of the prostate.  The decision at that time was to treat the prostate cancer conservatively by following the PSA every 6 months.  Patient underwent a TURP on November 3, 2021 with Dr. Pina.  There was no cancer reported in the pathology report from the prostate chips.  His PSA was resulted at 1.710.  Patient is pleased with the way he is voiding.  He reports some frequency.  He denies dysuria, hematuria, incomplete bladder emptying, intermittency, urgency, weak stream, straining, or nocturia.  He denies fever, chills, nausea, or vomiting.  He denies any  pain.  He does have a previous history of kidney stones and denies any kidney stone pain or symptoms.  He is not on any urological medications at this time.  Through shared decision-making with the patient, he desires to continue to monitor the PSA every 6 months.  He is happy with the way he voids and desires no further intervention at this time.  We will see the patient back in the clinic in 6 months with another PSA.  I discussed the plan in detail with the patient and he is in agreement with the plan.  All his questions were answered at today's visit.  I spent 30 minutes counseling this patient, reviewing the chart, labs, and imaging.       PSA history:  PSA was 4.060 on May 10, 2021  PSA was 5.200 on September 28, 2021  PSA was 2.390 on June 8, 2022  PSA was 2.520 on December 13, 2022  PSA was 2.420 on June 13, 2023  PSA was 2.550 on December 18, 2023  PSA  was 2.810 on June 18, 2024  PSA was 1.710 on January 15, 2024   ------------------------------------------------------------------  [January 15, 2025].       Review of Systems   Constitutional:  Negative for activity change and fever.   HENT:  Negative for hearing loss and trouble swallowing.    Eyes:  Negative for visual disturbance.   Respiratory:  Negative for cough, shortness of breath and wheezing.    Cardiovascular:  Negative for chest pain.   Gastrointestinal:  Negative for abdominal pain, diarrhea, nausea and vomiting.   Endocrine: Negative for polyuria.   Genitourinary:  Negative for bladder incontinence, decreased urine volume, difficulty urinating, discharge, dysuria, enuresis, erectile dysfunction, flank pain, frequency, genital sores, hematuria, penile pain, penile swelling, scrotal swelling, testicular pain and urgency.        Prostate cancer       Chronic prostatitis       History of elevated PSA       History of nephrolithiasis       Family history of prostate cancer   Musculoskeletal:  Negative for back pain and gait problem.   Integumentary:  Negative for rash.   Neurological:  Negative for speech difficulty and weakness.   Psychiatric/Behavioral:  Negative for behavioral problems and confusion.           Objective     Physical Exam  Vitals and nursing note reviewed.   Constitutional:       General: He is not in acute distress.     Appearance: Normal appearance. He is not ill-appearing, toxic-appearing or diaphoretic.   HENT:      Head: Normocephalic.   Eyes:      Extraocular Movements: Extraocular movements intact.   Cardiovascular:      Rate and Rhythm: Normal rate and regular rhythm.      Heart sounds: Normal heart sounds.   Pulmonary:      Effort: Pulmonary effort is normal. No respiratory distress.      Breath sounds: Normal breath sounds. No wheezing, rhonchi or rales.   Abdominal:      General: Bowel sounds are normal.      Palpations: Abdomen is soft.      Tenderness: There is no abdominal  tenderness. There is no right CVA tenderness, left CVA tenderness, guarding or rebound.   Genitourinary:     Rectum: Normal.      Comments: FRANCISCO greater than 30 g, symmetrical nontender, somewhat nodular and firm  Musculoskeletal:         General: Normal range of motion.      Cervical back: Normal range of motion. No rigidity.   Skin:     General: Skin is warm and dry.   Neurological:      General: No focal deficit present.      Mental Status: He is alert and oriented to person, place, and time.      Motor: No weakness.      Coordination: Coordination normal.      Gait: Gait normal.   Psychiatric:         Mood and Affect: Mood normal.         Behavior: Behavior normal.         Thought Content: Thought content normal.          Assessment and Plan     1. Malignant neoplasm of prostate  Overview:  low grade    Orders:  -     PSA, Total (Diagnostic); Future; Expected date: 07/15/2025    2. Chronic prostatitis    3. History of elevated PSA    4. History of nephrolithiasis    5. Family history of prostate cancer             1. Patient is pleased with the way he is voiding and desires no further intervention for voiding  2. PSA in 6 months  3. Follow-up with urology in 6 months

## 2025-01-15 ENCOUNTER — OFFICE VISIT (OUTPATIENT)
Dept: UROLOGY | Facility: CLINIC | Age: 65
End: 2025-01-15
Payer: COMMERCIAL

## 2025-01-15 VITALS
SYSTOLIC BLOOD PRESSURE: 148 MMHG | RESPIRATION RATE: 14 BRPM | DIASTOLIC BLOOD PRESSURE: 83 MMHG | TEMPERATURE: 98 F | BODY MASS INDEX: 35.24 KG/M2 | HEART RATE: 77 BPM | WEIGHT: 225 LBS

## 2025-01-15 DIAGNOSIS — N41.1 CHRONIC PROSTATITIS: Chronic | ICD-10-CM

## 2025-01-15 DIAGNOSIS — C61 MALIGNANT NEOPLASM OF PROSTATE: Primary | Chronic | ICD-10-CM

## 2025-01-15 DIAGNOSIS — Z80.42 FAMILY HISTORY OF PROSTATE CANCER: ICD-10-CM

## 2025-01-15 DIAGNOSIS — Z87.898 HISTORY OF ELEVATED PSA: Chronic | ICD-10-CM

## 2025-01-15 DIAGNOSIS — Z87.442 HISTORY OF NEPHROLITHIASIS: Chronic | ICD-10-CM

## 2025-01-15 PROCEDURE — 99999 PR PBB SHADOW E&M-EST. PATIENT-LVL IV: CPT | Mod: PBBFAC,,, | Performed by: NURSE PRACTITIONER

## 2025-01-15 PROCEDURE — 99214 OFFICE O/P EST MOD 30 MIN: CPT | Mod: S$PBB,,, | Performed by: NURSE PRACTITIONER

## 2025-01-15 PROCEDURE — 99214 OFFICE O/P EST MOD 30 MIN: CPT | Mod: PBBFAC | Performed by: NURSE PRACTITIONER

## 2025-01-15 NOTE — PATIENT INSTRUCTIONS
1. Patient is pleased with the way he is voiding and desires no further intervention for voiding  2. PSA in 6 months  3. Follow-up with urology in 6 months

## 2025-02-05 ENCOUNTER — OFFICE VISIT (OUTPATIENT)
Dept: FAMILY MEDICINE | Facility: CLINIC | Age: 65
End: 2025-02-05
Payer: COMMERCIAL

## 2025-02-05 VITALS
BODY MASS INDEX: 33.12 KG/M2 | WEIGHT: 211 LBS | HEIGHT: 67 IN | HEART RATE: 71 BPM | DIASTOLIC BLOOD PRESSURE: 70 MMHG | OXYGEN SATURATION: 97 % | TEMPERATURE: 99 F | SYSTOLIC BLOOD PRESSURE: 102 MMHG

## 2025-02-05 DIAGNOSIS — R52 BODY ACHES: ICD-10-CM

## 2025-02-05 DIAGNOSIS — R07.0 PAIN IN THROAT: ICD-10-CM

## 2025-02-05 DIAGNOSIS — J10.1 INFLUENZA A: Primary | ICD-10-CM

## 2025-02-05 LAB
CTP QC/QA: YES
MOLECULAR STREP A: NEGATIVE
POC MOLECULAR INFLUENZA A AGN: POSITIVE
POC MOLECULAR INFLUENZA B AGN: NEGATIVE
SARS-COV-2 RDRP RESP QL NAA+PROBE: NEGATIVE

## 2025-02-05 PROCEDURE — 87635 SARS-COV-2 COVID-19 AMP PRB: CPT | Mod: QW,,, | Performed by: NURSE PRACTITIONER

## 2025-02-05 PROCEDURE — 87651 STREP A DNA AMP PROBE: CPT | Mod: QW,,, | Performed by: NURSE PRACTITIONER

## 2025-02-05 PROCEDURE — 99213 OFFICE O/P EST LOW 20 MIN: CPT | Mod: ,,, | Performed by: NURSE PRACTITIONER

## 2025-02-05 PROCEDURE — 87502 INFLUENZA DNA AMP PROBE: CPT | Mod: QW,,, | Performed by: NURSE PRACTITIONER

## 2025-02-05 NOTE — LETTER
February 5, 2025      [facility]  [address] [facility] [address]  Phone: [phone]  Fax: [phone]       Patient: Ernesto Dillon   YOB: 1960  Date of Visit: 02/05/2025    To Whom It May Concern:    Paul Dillon  was at [facility] on 02/05/2025. The patient may return to work/school on 2/8/2025 with no restrictions. If you have any questions or concerns, or if I can be of further assistance, please do not hesitate to contact me.    Sincerely,    MALDONADO Osorio

## 2025-02-05 NOTE — PROGRESS NOTES
Subjective     Patient ID: Ernesto Dillon is a 64 y.o. male.    Chief Complaint: Sore Throat (Chills/Cough/Chest Congestion x 3 days)    Pt presents with sore throat, cough and congestion x 3 days.     Sore Throat   Associated symptoms include congestion and coughing. Pertinent negatives include no abdominal pain, headaches, shortness of breath or stridor.     Review of Systems   Constitutional:  Negative for activity change, appetite change, fatigue and fever.   HENT:  Positive for nasal congestion and sore throat. Negative for nosebleeds, postnasal drip, rhinorrhea, sinus pressure/congestion and sneezing.    Eyes:  Negative for pain and itching.   Respiratory:  Positive for cough. Negative for chest tightness, shortness of breath, wheezing and stridor.    Cardiovascular:  Negative for chest pain.   Gastrointestinal:  Negative for abdominal pain.   Genitourinary:  Negative for dysuria.   Musculoskeletal:  Negative for back pain.   Neurological:  Negative for dizziness and headaches.   Psychiatric/Behavioral:  Negative for behavioral problems and confusion.           Objective     Physical Exam  Vitals and nursing note reviewed.   Constitutional:       Appearance: Normal appearance.   Cardiovascular:      Rate and Rhythm: Normal rate and regular rhythm.      Heart sounds: Normal heart sounds.   Pulmonary:      Effort: Pulmonary effort is normal.      Breath sounds: Normal breath sounds.   Musculoskeletal:         General: Normal range of motion.   Neurological:      Mental Status: He is alert and oriented to person, place, and time.   Psychiatric:         Mood and Affect: Mood normal.         Behavior: Behavior normal.            Assessment and Plan     1. Influenza A    2. Body aches  -     POCT COVID-19 Rapid Screening  -     POCT Influenza A/B Molecular    3. Pain in throat  -     POCT Strep A, Molecular        Quarantine 5 days from symptoms onset. Drink plenty of fluids and tylenol if needed.           No follow-ups on file.

## 2025-03-21 DIAGNOSIS — I10 HYPERTENSION, ESSENTIAL: ICD-10-CM

## 2025-03-21 RX ORDER — LISINOPRIL 40 MG/1
40 TABLET ORAL
Qty: 90 TABLET | Refills: 0 | Status: SHIPPED | OUTPATIENT
Start: 2025-03-21

## 2025-03-21 RX ORDER — HYDROCHLOROTHIAZIDE 12.5 MG/1
12.5 CAPSULE ORAL
Qty: 90 CAPSULE | Refills: 0 | Status: SHIPPED | OUTPATIENT
Start: 2025-03-21

## 2025-03-25 ENCOUNTER — OFFICE VISIT (OUTPATIENT)
Dept: FAMILY MEDICINE | Facility: CLINIC | Age: 65
End: 2025-03-25
Payer: MEDICARE

## 2025-03-25 ENCOUNTER — PATIENT MESSAGE (OUTPATIENT)
Dept: FAMILY MEDICINE | Facility: CLINIC | Age: 65
End: 2025-03-25
Payer: MEDICARE

## 2025-03-25 VITALS
SYSTOLIC BLOOD PRESSURE: 136 MMHG | DIASTOLIC BLOOD PRESSURE: 70 MMHG | RESPIRATION RATE: 16 BRPM | HEART RATE: 65 BPM | WEIGHT: 221.81 LBS | BODY MASS INDEX: 34.81 KG/M2 | OXYGEN SATURATION: 99 % | HEIGHT: 67 IN | TEMPERATURE: 98 F

## 2025-03-25 DIAGNOSIS — Z72.0 SMOKELESS TOBACCO USE: ICD-10-CM

## 2025-03-25 DIAGNOSIS — N18.31 CHRONIC KIDNEY DISEASE, STAGE 3A: ICD-10-CM

## 2025-03-25 DIAGNOSIS — Z12.11 SCREENING FOR MALIGNANT NEOPLASM OF COLON: ICD-10-CM

## 2025-03-25 DIAGNOSIS — I10 HYPERTENSION, ESSENTIAL: Primary | ICD-10-CM

## 2025-03-25 LAB
ALBUMIN SERPL BCP-MCNC: 3.9 G/DL (ref 3.4–4.8)
ALBUMIN/GLOB SERPL: 1.1 {RATIO}
ALP SERPL-CCNC: 68 U/L (ref 40–150)
ALT SERPL W P-5'-P-CCNC: 20 U/L
ANION GAP SERPL CALCULATED.3IONS-SCNC: 12 MMOL/L (ref 7–16)
AST SERPL W P-5'-P-CCNC: 28 U/L (ref 11–45)
BASOPHILS # BLD AUTO: 0.03 K/UL (ref 0–0.2)
BASOPHILS NFR BLD AUTO: 0.6 % (ref 0–1)
BILIRUB SERPL-MCNC: 0.4 MG/DL
BUN SERPL-MCNC: 21 MG/DL (ref 8–26)
BUN/CREAT SERPL: 12 (ref 6–20)
CALCIUM SERPL-MCNC: 9.3 MG/DL (ref 8.8–10)
CHLORIDE SERPL-SCNC: 102 MMOL/L (ref 98–107)
CHOLEST SERPL-MCNC: 176 MG/DL
CHOLEST/HDLC SERPL: 4.6 {RATIO}
CO2 SERPL-SCNC: 29 MMOL/L (ref 23–31)
CREAT SERPL-MCNC: 1.69 MG/DL (ref 0.72–1.25)
CREAT UR-MCNC: 149 MG/DL (ref 23–375)
DIFFERENTIAL METHOD BLD: ABNORMAL
EGFR (NO RACE VARIABLE) (RUSH/TITUS): 44 ML/MIN/1.73M2
EOSINOPHIL # BLD AUTO: 0.18 K/UL (ref 0–0.5)
EOSINOPHIL NFR BLD AUTO: 3.3 % (ref 1–4)
ERYTHROCYTE [DISTWIDTH] IN BLOOD BY AUTOMATED COUNT: 14.2 % (ref 11.5–14.5)
GLOBULIN SER-MCNC: 3.4 G/DL (ref 2–4)
GLUCOSE SERPL-MCNC: 91 MG/DL (ref 82–115)
HCT VFR BLD AUTO: 42.7 % (ref 40–54)
HDLC SERPL-MCNC: 38 MG/DL (ref 35–60)
HGB BLD-MCNC: 13.5 G/DL (ref 13.5–18)
IMM GRANULOCYTES # BLD AUTO: 0.02 K/UL (ref 0–0.04)
IMM GRANULOCYTES NFR BLD: 0.4 % (ref 0–0.4)
LDLC SERPL CALC-MCNC: 112 MG/DL
LDLC/HDLC SERPL: 2.9 {RATIO}
LYMPHOCYTES # BLD AUTO: 1.66 K/UL (ref 1–4.8)
LYMPHOCYTES NFR BLD AUTO: 30.6 % (ref 27–41)
MCH RBC QN AUTO: 29.1 PG (ref 27–31)
MCHC RBC AUTO-ENTMCNC: 31.6 G/DL (ref 32–36)
MCV RBC AUTO: 92 FL (ref 80–96)
MICROALBUMIN UR-MCNC: 1.3 MG/DL
MICROALBUMIN/CREAT RATIO PNL UR: 8.7 MG/G (ref 0–30)
MONOCYTES # BLD AUTO: 0.56 K/UL (ref 0–0.8)
MONOCYTES NFR BLD AUTO: 10.3 % (ref 2–6)
MPC BLD CALC-MCNC: 11.6 FL (ref 9.4–12.4)
NEUTROPHILS # BLD AUTO: 2.97 K/UL (ref 1.8–7.7)
NEUTROPHILS NFR BLD AUTO: 54.8 % (ref 53–65)
NONHDLC SERPL-MCNC: 138 MG/DL
NRBC # BLD AUTO: 0 X10E3/UL
NRBC, AUTO (.00): 0 %
PLATELET # BLD AUTO: 234 K/UL (ref 150–400)
POTASSIUM SERPL-SCNC: 3.5 MMOL/L (ref 3.5–5.1)
PROT SERPL-MCNC: 7.3 G/DL (ref 5.8–7.6)
RBC # BLD AUTO: 4.64 M/UL (ref 4.6–6.2)
SODIUM SERPL-SCNC: 139 MMOL/L (ref 136–145)
TRIGL SERPL-MCNC: 130 MG/DL (ref 34–140)
TSH SERPL DL<=0.005 MIU/L-ACNC: 1.02 UIU/ML (ref 0.35–4.94)
VLDLC SERPL-MCNC: 26 MG/DL
WBC # BLD AUTO: 5.42 K/UL (ref 4.5–11)

## 2025-03-25 PROCEDURE — 1159F MED LIST DOCD IN RCRD: CPT | Mod: ,,, | Performed by: NURSE PRACTITIONER

## 2025-03-25 PROCEDURE — 3078F DIAST BP <80 MM HG: CPT | Mod: ,,, | Performed by: NURSE PRACTITIONER

## 2025-03-25 PROCEDURE — 3008F BODY MASS INDEX DOCD: CPT | Mod: ,,, | Performed by: NURSE PRACTITIONER

## 2025-03-25 PROCEDURE — 99214 OFFICE O/P EST MOD 30 MIN: CPT | Mod: ,,, | Performed by: NURSE PRACTITIONER

## 2025-03-25 PROCEDURE — 4010F ACE/ARB THERAPY RXD/TAKEN: CPT | Mod: ,,, | Performed by: NURSE PRACTITIONER

## 2025-03-25 PROCEDURE — 3075F SYST BP GE 130 - 139MM HG: CPT | Mod: ,,, | Performed by: NURSE PRACTITIONER

## 2025-03-25 PROCEDURE — 82043 UR ALBUMIN QUANTITATIVE: CPT | Mod: ,,, | Performed by: CLINICAL MEDICAL LABORATORY

## 2025-03-25 PROCEDURE — 80050 GENERAL HEALTH PANEL: CPT | Mod: ,,, | Performed by: CLINICAL MEDICAL LABORATORY

## 2025-03-25 PROCEDURE — 82570 ASSAY OF URINE CREATININE: CPT | Mod: ,,, | Performed by: CLINICAL MEDICAL LABORATORY

## 2025-03-25 PROCEDURE — 1101F PT FALLS ASSESS-DOCD LE1/YR: CPT | Mod: ,,, | Performed by: NURSE PRACTITIONER

## 2025-03-25 PROCEDURE — 1160F RVW MEDS BY RX/DR IN RCRD: CPT | Mod: ,,, | Performed by: NURSE PRACTITIONER

## 2025-03-25 PROCEDURE — 80061 LIPID PANEL: CPT | Mod: ,,, | Performed by: CLINICAL MEDICAL LABORATORY

## 2025-03-25 PROCEDURE — 3288F FALL RISK ASSESSMENT DOCD: CPT | Mod: ,,, | Performed by: NURSE PRACTITIONER

## 2025-03-25 RX ORDER — GABAPENTIN 300 MG/1
300 CAPSULE ORAL 3 TIMES DAILY PRN
Qty: 90 CAPSULE | Refills: 1 | Status: SHIPPED | OUTPATIENT
Start: 2025-03-25

## 2025-03-25 RX ORDER — HYDROCHLOROTHIAZIDE 12.5 MG/1
12.5 CAPSULE ORAL DAILY
Qty: 90 CAPSULE | Refills: 1 | Status: SHIPPED | OUTPATIENT
Start: 2025-03-25

## 2025-03-25 RX ORDER — LISINOPRIL 40 MG/1
40 TABLET ORAL DAILY
Qty: 90 TABLET | Refills: 1 | Status: SHIPPED | OUTPATIENT
Start: 2025-03-25

## 2025-03-25 NOTE — PROGRESS NOTES
Subjective     Patient ID: Ernesto Dillon is a 65 y.o. male.    Chief Complaint: Hypertension and Health Maintenance (COVID-19 Vaccine(1) declined/TETANUS VACCINE unknown/Shingles Vaccine(1 of 2) declined/Pneumococcal Vaccines (Age 50+)(1 of 2 - PCV) declined/Colorectal Cancer Screening Never done/RSV Vaccine (Age 60+ and Pregnant patients)(1 - Risk 60-74 years 1-dose series) declined/Abdominal Aortic Aneurysm Screening Never done )    Pt presents for a follow-up for hypertension.       Review of Systems   Constitutional:  Negative for activity change, appetite change, fatigue and fever.   HENT:  Negative for nasal congestion, nosebleeds, postnasal drip, rhinorrhea, sinus pressure/congestion, sneezing and sore throat.    Eyes:  Negative for pain and itching.   Respiratory:  Negative for cough, chest tightness, shortness of breath, wheezing and stridor.    Cardiovascular:  Negative for chest pain.   Gastrointestinal:  Negative for abdominal pain.   Genitourinary:  Negative for dysuria.   Musculoskeletal:  Negative for back pain.   Neurological:  Negative for dizziness and headaches.   Psychiatric/Behavioral:  Negative for behavioral problems and confusion.           Objective     Physical Exam  Vitals and nursing note reviewed.   Constitutional:       Appearance: Normal appearance.   Cardiovascular:      Rate and Rhythm: Normal rate and regular rhythm.      Heart sounds: Normal heart sounds.   Pulmonary:      Effort: Pulmonary effort is normal.      Breath sounds: Normal breath sounds.   Musculoskeletal:         General: Normal range of motion.   Neurological:      Mental Status: He is alert and oriented to person, place, and time.   Psychiatric:         Mood and Affect: Mood normal.         Behavior: Behavior normal.            Assessment and Plan     1. Hypertension, essential  -     lisinopriL (PRINIVIL,ZESTRIL) 40 MG tablet; Take 1 tablet (40 mg total) by mouth once daily.  Dispense: 90 tablet; Refill:  1  -     hydroCHLOROthiazide (MICROZIDE) 12.5 mg capsule; Take 1 capsule (12.5 mg total) by mouth once daily.  Dispense: 90 capsule; Refill: 1  -     CBC Auto Differential; Future; Expected date: 03/25/2025  -     Comprehensive Metabolic Panel; Future; Expected date: 03/25/2025  -     Lipid Panel; Future; Expected date: 03/25/2025  -     TSH; Future; Expected date: 03/25/2025  Controlled at 136/70  Low sodium diet    2. Chronic kidney disease, stage 3a  -     Microalbumin/Creatinine Ratio, Urine; Future; Expected date: 03/25/2025  Avoid nsaids  Drink plenty of water    3. Screening for malignant neoplasm of colon  -     Colonoscopy; Future; Expected date: 03/25/2025    4. Smokeless tobacco use  -      AAA Screening; Future; Expected date: 03/25/2025    Other orders  -     gabapentin (NEURONTIN) 300 MG capsule; Take 1 capsule (300 mg total) by mouth 3 (three) times daily as needed (leg pain).  Dispense: 90 capsule; Refill: 1        Will call pt with lab results and aaa ultrasound appt. Pt declined a pneumonia vaccine.          Follow up in about 6 months (around 9/25/2025).

## 2025-04-24 ENCOUNTER — HOSPITAL ENCOUNTER (OUTPATIENT)
Dept: RADIOLOGY | Facility: HOSPITAL | Age: 65
Discharge: HOME | End: 2025-04-24
Attending: NURSE PRACTITIONER
Payer: MEDICARE

## 2025-04-24 DIAGNOSIS — Z72.0 SMOKELESS TOBACCO USE: ICD-10-CM

## 2025-04-24 PROCEDURE — 76706 US ABDL AORTA SCREEN AAA: CPT | Mod: TC

## 2025-04-27 ENCOUNTER — PATIENT MESSAGE (OUTPATIENT)
Dept: FAMILY MEDICINE | Facility: CLINIC | Age: 65
End: 2025-04-27
Payer: MEDICARE

## 2025-04-27 DIAGNOSIS — Q25.40 ABNORMALITY OF ABDOMINAL AORTA: Primary | ICD-10-CM

## 2025-05-21 ENCOUNTER — HOSPITAL ENCOUNTER (OUTPATIENT)
Dept: RADIOLOGY | Facility: HOSPITAL | Age: 65
Discharge: HOME | End: 2025-05-21
Attending: NURSE PRACTITIONER
Payer: MEDICARE

## 2025-05-21 DIAGNOSIS — Q25.40 ABNORMALITY OF ABDOMINAL AORTA: ICD-10-CM

## 2025-05-21 LAB — CREAT SERPL-MCNC: 1.7 MG/DL (ref 0.6–1.3)

## 2025-05-21 PROCEDURE — 25500020 PHARM REV CODE 255: Performed by: NURSE PRACTITIONER

## 2025-05-21 PROCEDURE — 74174 CTA ABD&PLVS W/CONTRAST: CPT | Mod: TC

## 2025-05-21 PROCEDURE — 74174 CTA ABD&PLVS W/CONTRAST: CPT | Mod: 26,,, | Performed by: RADIOLOGY

## 2025-05-21 RX ORDER — IOPAMIDOL 755 MG/ML
100 INJECTION, SOLUTION INTRAVASCULAR
Status: COMPLETED | OUTPATIENT
Start: 2025-05-21 | End: 2025-05-21

## 2025-05-21 RX ADMIN — IOPAMIDOL 100 ML: 755 INJECTION, SOLUTION INTRAVENOUS at 08:05

## 2025-05-23 ENCOUNTER — PATIENT MESSAGE (OUTPATIENT)
Dept: FAMILY MEDICINE | Facility: CLINIC | Age: 65
End: 2025-05-23
Payer: MEDICARE

## 2025-06-30 ENCOUNTER — EXTERNAL CHRONIC CARE MANAGEMENT (OUTPATIENT)
Dept: PRIMARY CARE CLINIC | Facility: CLINIC | Age: 65
End: 2025-06-30
Payer: MEDICARE

## 2025-06-30 PROCEDURE — 99490 CHRNC CARE MGMT STAFF 1ST 20: CPT | Mod: ,,, | Performed by: NURSE PRACTITIONER

## 2025-07-23 ENCOUNTER — OFFICE VISIT (OUTPATIENT)
Dept: UROLOGY | Facility: CLINIC | Age: 65
End: 2025-07-23
Payer: MEDICARE

## 2025-07-23 VITALS
HEART RATE: 86 BPM | HEIGHT: 67 IN | WEIGHT: 221 LBS | OXYGEN SATURATION: 97 % | DIASTOLIC BLOOD PRESSURE: 83 MMHG | BODY MASS INDEX: 34.69 KG/M2 | SYSTOLIC BLOOD PRESSURE: 132 MMHG

## 2025-07-23 DIAGNOSIS — C61 MALIGNANT NEOPLASM OF PROSTATE: Primary | ICD-10-CM

## 2025-07-23 PROCEDURE — 3061F NEG MICROALBUMINURIA REV: CPT | Mod: CPTII,,, | Performed by: UROLOGY

## 2025-07-23 PROCEDURE — 4010F ACE/ARB THERAPY RXD/TAKEN: CPT | Mod: CPTII,,, | Performed by: UROLOGY

## 2025-07-23 PROCEDURE — 3008F BODY MASS INDEX DOCD: CPT | Mod: CPTII,,, | Performed by: UROLOGY

## 2025-07-23 PROCEDURE — 1159F MED LIST DOCD IN RCRD: CPT | Mod: CPTII,,, | Performed by: UROLOGY

## 2025-07-23 PROCEDURE — 99213 OFFICE O/P EST LOW 20 MIN: CPT | Mod: PBBFAC | Performed by: UROLOGY

## 2025-07-23 PROCEDURE — 3288F FALL RISK ASSESSMENT DOCD: CPT | Mod: CPTII,,, | Performed by: UROLOGY

## 2025-07-23 PROCEDURE — 99999 PR PBB SHADOW E&M-EST. PATIENT-LVL III: CPT | Mod: PBBFAC,,, | Performed by: UROLOGY

## 2025-07-23 PROCEDURE — 3079F DIAST BP 80-89 MM HG: CPT | Mod: CPTII,,, | Performed by: UROLOGY

## 2025-07-23 PROCEDURE — 3075F SYST BP GE 130 - 139MM HG: CPT | Mod: CPTII,,, | Performed by: UROLOGY

## 2025-07-23 PROCEDURE — 1101F PT FALLS ASSESS-DOCD LE1/YR: CPT | Mod: CPTII,,, | Performed by: UROLOGY

## 2025-07-23 PROCEDURE — 3066F NEPHROPATHY DOC TX: CPT | Mod: CPTII,,, | Performed by: UROLOGY

## 2025-07-23 PROCEDURE — 99213 OFFICE O/P EST LOW 20 MIN: CPT | Mod: S$PBB,,, | Performed by: UROLOGY

## 2025-07-23 PROCEDURE — 1160F RVW MEDS BY RX/DR IN RCRD: CPT | Mod: CPTII,,, | Performed by: UROLOGY

## 2025-07-24 ENCOUNTER — TELEPHONE (OUTPATIENT)
Dept: UROLOGY | Facility: CLINIC | Age: 65
End: 2025-07-24
Payer: MEDICARE

## 2025-07-24 NOTE — PROGRESS NOTES
Assessment:   1. Malignant neoplasm of prostate  Overview:  low grade    Orders:  -     PSA, Total (Diagnostic); Future; Expected date: 07/23/2026         Plan:       The patient has no lower urinary tract symptoms and is doing well on active surveillance and plan to recheck PSA in 12 months.  He can return sooner if he develops problems.             Shane Alvarez MD  Urology  07/23/2025          UROLOGY HISTORY AND PHYSICAL EXAM    Subjective:      Patient ID: Ernesto Dillon is a 65 y.o. male.    Chief Complaint::   Follow-up  Pertinent negatives include no rash.     The patient is a 65-year-old male with a history of prostate cancer that has been on active surveillance and a modified watchful waiting who presents today reporting that he just got his PSA drawn earlier this morning and he is not having any lower urinary tract symptoms.  No sensation of incomplete bladder emptying, no urinary frequency, no intermittency, no straining, no weak urinary stream.     Past Medical History:   Diagnosis Date    BPH with obstruction/lower urinary tract symptoms     Elevated PSA     Hypertension     Kidney stone     Malignant neoplasm of prostate     low grade    Rash and nonspecific skin eruption 03/03/2023     Past Surgical History:   Procedure Laterality Date    CYSTOSCOPIC LITHOLAPAXY N/A 11/3/2021    Procedure: CYSTOLITHOLAPAXY;  Surgeon: Charles Pina Jr., MD;  Location: [facility] OR;  Service: Urology;  Laterality: N/A;    TRANSURETHRAL RESECTION OF PROSTATE N/A 11/3/2021    Procedure: TURP (TRANSURETHRAL RESECTION OF PROSTATE);  Surgeon: Charles Pina Jr., MD;  Location: [facility] OR;  Service: Urology;  Laterality: N/A;        Medications Ordered Prior to Encounter[1]     Review of patient's allergies indicates:  No Known Allergies  Vitals:    07/23/25 1347   BP: 132/83   Pulse: 86          Review of Systems   Constitutional:  Negative for activity change.   Respiratory:  Negative for shortness  of breath.    Genitourinary:  Negative for difficulty urinating, dysuria and hematuria.   Musculoskeletal:  Negative for back pain.   Skin:  Negative for rash.   Psychiatric/Behavioral:  Negative for sleep disturbance.       Objective:     Physical Exam  Vitals and nursing note reviewed.   Constitutional:       Appearance: Normal appearance. He is normal weight.   HENT:      Head: Normocephalic and atraumatic.   Eyes:      General: No scleral icterus.     Conjunctiva/sclera: Conjunctivae normal.   Cardiovascular:      Rate and Rhythm: Normal rate.   Pulmonary:      Effort: No respiratory distress.      Breath sounds: No wheezing.   Abdominal:      General: There is no distension.      Palpations: Abdomen is soft.   Musculoskeletal:         General: No deformity.   Skin:     General: Skin is warm and dry.      Findings: No rash.   Neurological:      General: No focal deficit present.      Mental Status: He is alert.   Psychiatric:         Mood and Affect: Mood normal.         Behavior: Behavior normal.         Thought Content: Thought content normal.         Judgment: Judgment normal.        Lab Results   Component Value Date    PSA 2.320 07/23/2025    PSA 1.710 01/15/2025    PSA 2.810 06/18/2024        CMP  Sodium   Date Value Ref Range Status   03/25/2025 139 136 - 145 mmol/L Final     Potassium   Date Value Ref Range Status   03/25/2025 3.5 3.5 - 5.1 mmol/L Final     Chloride   Date Value Ref Range Status   03/25/2025 102 98 - 107 mmol/L Final     CO2   Date Value Ref Range Status   03/25/2025 29 23 - 31 mmol/L Final     Glucose   Date Value Ref Range Status   03/25/2025 91 82 - 115 mg/dL Final     BUN   Date Value Ref Range Status   03/25/2025 21 8 - 26 mg/dL Final     Creatinine   Date Value Ref Range Status   03/25/2025 1.69 (H) 0.72 - 1.25 mg/dL Final     Calcium   Date Value Ref Range Status   03/25/2025 9.3 8.8 - 10.0 mg/dL Final     Total Protein   Date Value Ref Range Status   03/25/2025 7.3 5.8 - 7.6 g/dL  Final     Albumin   Date Value Ref Range Status   03/25/2025 3.9 3.4 - 4.8 g/dL Final     Bilirubin, Total   Date Value Ref Range Status   03/25/2025 0.4 <=1.5 mg/dL Final     Alk Phos   Date Value Ref Range Status   03/25/2025 68 40 - 150 U/L Final     AST   Date Value Ref Range Status   03/25/2025 28 11 - 45 U/L Final     ALT   Date Value Ref Range Status   03/25/2025 20 <=55 U/L Final     Anion Gap   Date Value Ref Range Status   03/25/2025 12 7 - 16 mmol/L Final     eGFR   Date Value Ref Range Status   03/25/2025 44 (L) >=60 mL/min/1.73m2 Final     Comment:     Estimated GFR calculated using the CKD-EPI creatinine (2021) equation.      BMP  Lab Results   Component Value Date     03/25/2025    K 3.5 03/25/2025     03/25/2025    CO2 29 03/25/2025    BUN 21 03/25/2025    CREATININE 1.69 (H) 03/25/2025    CALCIUM 9.3 03/25/2025    ANIONGAP 12 03/25/2025    EGFRNORACEVR 44 (L) 03/25/2025                [1]  Current Outpatient Medications on File Prior to Visit   Medication Sig Dispense Refill    gabapentin (NEURONTIN) 300 MG capsule Take 1 capsule (300 mg total) by mouth 3 (three) times daily as needed (leg pain). 90 capsule 1    hydroCHLOROthiazide (MICROZIDE) 12.5 mg capsule Take 1 capsule (12.5 mg total) by mouth once daily. 90 capsule 1    lisinopriL (PRINIVIL,ZESTRIL) 40 MG tablet Take 1 tablet (40 mg total) by mouth once daily. 90 tablet 1     No current facility-administered medications on file prior to visit.

## 2025-07-24 NOTE — TELEPHONE ENCOUNTER
----- Message from Juliano Wilkins NP sent at 7/24/2025 10:47 AM CDT -----  Please notify patient his PSA was 2.320 and he will need a follow-up appointment thanks  I called pt and spoke with him and relayed the above message to him.  His follow up appointment is set for 8/14/25 at 1:40pm.  He voiced understanding.  ----- Message -----  From: Lab, Background User  Sent: 7/23/2025   4:09 PM CDT  To: Juliano Wilkins NP

## 2025-07-30 DIAGNOSIS — Z12.11 ENCOUNTER FOR SCREENING COLONOSCOPY: Primary | ICD-10-CM

## 2025-07-30 RX ORDER — POLYETHYLENE GLYCOL 3350, SODIUM SULFATE ANHYDROUS, SODIUM BICARBONATE, SODIUM CHLORIDE, POTASSIUM CHLORIDE 236; 22.74; 6.74; 5.86; 2.97 G/4L; G/4L; G/4L; G/4L; G/4L
4 POWDER, FOR SOLUTION ORAL ONCE
Qty: 4000 ML | Refills: 0 | Status: SHIPPED | OUTPATIENT
Start: 2025-07-30 | End: 2025-07-30

## 2025-07-31 ENCOUNTER — EXTERNAL CHRONIC CARE MANAGEMENT (OUTPATIENT)
Dept: FAMILY MEDICINE | Facility: CLINIC | Age: 65
End: 2025-07-31
Payer: MEDICARE

## 2025-07-31 PROCEDURE — 99490 CHRNC CARE MGMT STAFF 1ST 20: CPT | Mod: ,,, | Performed by: NURSE PRACTITIONER

## 2025-08-13 ENCOUNTER — ANESTHESIA (OUTPATIENT)
Dept: GASTROENTEROLOGY | Facility: HOSPITAL | Age: 65
End: 2025-08-13
Payer: MEDICARE

## 2025-08-13 ENCOUNTER — HOSPITAL ENCOUNTER (OUTPATIENT)
Dept: GASTROENTEROLOGY | Facility: HOSPITAL | Age: 65
Discharge: HOME | End: 2025-08-13
Attending: NURSE PRACTITIONER | Admitting: INTERNAL MEDICINE
Payer: MEDICARE

## 2025-08-13 ENCOUNTER — ANESTHESIA EVENT (OUTPATIENT)
Dept: GASTROENTEROLOGY | Facility: HOSPITAL | Age: 65
End: 2025-08-13
Payer: MEDICARE

## 2025-08-13 VITALS
WEIGHT: 222 LBS | TEMPERATURE: 98 F | OXYGEN SATURATION: 97 % | SYSTOLIC BLOOD PRESSURE: 92 MMHG | DIASTOLIC BLOOD PRESSURE: 43 MMHG | HEIGHT: 66 IN | RESPIRATION RATE: 12 BRPM | BODY MASS INDEX: 35.68 KG/M2 | HEART RATE: 68 BPM

## 2025-08-13 DIAGNOSIS — Z12.11 SCREENING FOR MALIGNANT NEOPLASM OF COLON: ICD-10-CM

## 2025-08-13 PROCEDURE — 88305 TISSUE EXAM BY PATHOLOGIST: CPT | Mod: 26,,, | Performed by: PATHOLOGY

## 2025-08-13 PROCEDURE — 37000008 HC ANESTHESIA 1ST 15 MINUTES

## 2025-08-13 PROCEDURE — 27201423 OPTIME MED/SURG SUP & DEVICES STERILE SUPPLY

## 2025-08-13 PROCEDURE — 88305 TISSUE EXAM BY PATHOLOGIST: CPT | Mod: TC,91,SUR | Performed by: INTERNAL MEDICINE

## 2025-08-13 PROCEDURE — 25000003 PHARM REV CODE 250

## 2025-08-13 PROCEDURE — 63600175 PHARM REV CODE 636 W HCPCS

## 2025-08-13 PROCEDURE — 37000009 HC ANESTHESIA EA ADD 15 MINS

## 2025-08-13 RX ORDER — LIDOCAINE HYDROCHLORIDE 20 MG/ML
INJECTION, SOLUTION EPIDURAL; INFILTRATION; INTRACAUDAL; PERINEURAL
Status: DISCONTINUED | OUTPATIENT
Start: 2025-08-13 | End: 2025-08-13

## 2025-08-13 RX ORDER — SODIUM CHLORIDE 0.9 % (FLUSH) 0.9 %
10 SYRINGE (ML) INJECTION
Status: DISCONTINUED | OUTPATIENT
Start: 2025-08-13 | End: 2025-08-14 | Stop reason: HOSPADM

## 2025-08-13 RX ORDER — PROPOFOL 10 MG/ML
VIAL (ML) INTRAVENOUS
Status: DISCONTINUED | OUTPATIENT
Start: 2025-08-13 | End: 2025-08-13

## 2025-08-13 RX ORDER — SODIUM CHLORIDE 9 MG/ML
INJECTION, SOLUTION INTRAVENOUS CONTINUOUS PRN
Status: DISCONTINUED | OUTPATIENT
Start: 2025-08-13 | End: 2025-08-13

## 2025-08-13 RX ORDER — PHENYLEPHRINE HYDROCHLORIDE 10 MG/ML
INJECTION INTRAVENOUS
Status: DISCONTINUED | OUTPATIENT
Start: 2025-08-13 | End: 2025-08-13

## 2025-08-13 RX ADMIN — PROPOFOL 40 MG: 10 INJECTION, EMULSION INTRAVENOUS at 10:08

## 2025-08-13 RX ADMIN — PHENYLEPHRINE HYDROCHLORIDE 100 MCG: 10 INJECTION INTRAVENOUS at 10:08

## 2025-08-13 RX ADMIN — LIDOCAINE HYDROCHLORIDE 100 MG: 20 INJECTION, SOLUTION EPIDURAL; INFILTRATION; INTRACAUDAL; PERINEURAL at 10:08

## 2025-08-13 RX ADMIN — PROPOFOL 100 MG: 10 INJECTION, EMULSION INTRAVENOUS at 10:08

## 2025-08-13 RX ADMIN — SODIUM CHLORIDE: 9 INJECTION, SOLUTION INTRAVENOUS at 10:08

## 2025-08-14 LAB
DHEA SERPL-MCNC: NORMAL
ESTROGEN SERPL-MCNC: NORMAL PG/ML
INSULIN SERPL-ACNC: NORMAL U[IU]/ML
LAB AP GROSS DESCRIPTION: NORMAL
LAB AP LABORATORY NOTES: NORMAL
T3RU NFR SERPL: NORMAL %

## (undated) DEVICE — Device

## (undated) DEVICE — BAG DRAINAGE URINE 4000ML LG CAPACITY

## (undated) DEVICE — IRRIGATION Y-TYPE SET

## (undated) DEVICE — EVACUATOR BLADDER

## (undated) DEVICE — STRAP CATHETER FOLEY

## (undated) DEVICE — GLOVE SURGICAL PROTEXIS PI BLUE SIZE 6.5

## (undated) DEVICE — SYRINGE 30CC LL

## (undated) DEVICE — SYRINGE 60CC CATHETER TIP

## (undated) DEVICE — CDS CYSTO

## (undated) DEVICE — WATER STERILE 3000ML F/IRRIG

## (undated) DEVICE — GLOVE SURGICAL PROTEXIS PI CLASSIC SIZE 7.5

## (undated) DEVICE — BAG DRAINAGE UROLOGY